# Patient Record
Sex: MALE | Race: WHITE | NOT HISPANIC OR LATINO | URBAN - METROPOLITAN AREA
[De-identification: names, ages, dates, MRNs, and addresses within clinical notes are randomized per-mention and may not be internally consistent; named-entity substitution may affect disease eponyms.]

---

## 2018-01-01 ENCOUNTER — INPATIENT (INPATIENT)
Facility: HOSPITAL | Age: 48
LOS: 2 days | End: 2018-12-21
Attending: INTERNAL MEDICINE | Admitting: INTERNAL MEDICINE
Payer: COMMERCIAL

## 2018-01-01 VITALS
RESPIRATION RATE: 18 BRPM | OXYGEN SATURATION: 100 % | HEART RATE: 112 BPM | SYSTOLIC BLOOD PRESSURE: 84 MMHG | DIASTOLIC BLOOD PRESSURE: 49 MMHG

## 2018-01-01 VITALS
SYSTOLIC BLOOD PRESSURE: 70 MMHG | RESPIRATION RATE: 16 BRPM | DIASTOLIC BLOOD PRESSURE: 34 MMHG | HEART RATE: 102 BPM | TEMPERATURE: 94 F

## 2018-01-01 LAB
ALBUMIN SERPL ELPH-MCNC: 1.9 G/DL — LOW (ref 3.5–5.2)
ALBUMIN SERPL ELPH-MCNC: 1.9 G/DL — LOW (ref 3.5–5.2)
ALBUMIN SERPL ELPH-MCNC: 2.3 G/DL — LOW (ref 3.5–5.2)
ALP SERPL-CCNC: 787 U/L — HIGH (ref 30–115)
ALP SERPL-CCNC: 842 U/L — HIGH (ref 30–115)
ALP SERPL-CCNC: 881 U/L — HIGH (ref 30–115)
ALT FLD-CCNC: 530 U/L — HIGH (ref 0–41)
ALT FLD-CCNC: 611 U/L — HIGH (ref 0–41)
ALT FLD-CCNC: 844 U/L — HIGH (ref 0–41)
AMMONIA BLD-MCNC: 97 UMOL/L — HIGH (ref 11–55)
ANION GAP SERPL CALC-SCNC: 25 MMOL/L — HIGH (ref 7–14)
ANION GAP SERPL CALC-SCNC: 28 MMOL/L — HIGH (ref 7–14)
ANION GAP SERPL CALC-SCNC: 28 MMOL/L — HIGH (ref 7–14)
ANION GAP SERPL CALC-SCNC: 29 MMOL/L — HIGH (ref 7–14)
ANION GAP SERPL CALC-SCNC: 29 MMOL/L — HIGH (ref 7–14)
ANION GAP SERPL CALC-SCNC: 30 MMOL/L — HIGH (ref 7–14)
ANION GAP SERPL CALC-SCNC: 30 MMOL/L — HIGH (ref 7–14)
APTT BLD: 37.7 SEC — SIGNIFICANT CHANGE UP (ref 27–39.2)
AST SERPL-CCNC: 2229 U/L — HIGH (ref 0–41)
AST SERPL-CCNC: 2617 U/L — HIGH (ref 0–41)
AST SERPL-CCNC: 3118 U/L — HIGH (ref 0–41)
BASE EXCESS BLDV CALC-SCNC: -10.3 MMOL/L — LOW (ref -2–2)
BASOPHILS # BLD AUTO: 0.01 K/UL — SIGNIFICANT CHANGE UP (ref 0–0.2)
BASOPHILS # BLD AUTO: 0.02 K/UL — SIGNIFICANT CHANGE UP (ref 0–0.2)
BASOPHILS NFR BLD AUTO: 0.1 % — SIGNIFICANT CHANGE UP (ref 0–1)
BASOPHILS NFR BLD AUTO: 0.2 % — SIGNIFICANT CHANGE UP (ref 0–1)
BILIRUB DIRECT SERPL-MCNC: 5.2 MG/DL — HIGH (ref 0–0.2)
BILIRUB INDIRECT FLD-MCNC: 1.5 MG/DL — HIGH (ref 0.2–1.2)
BILIRUB SERPL-MCNC: 6.6 MG/DL — HIGH (ref 0.2–1.2)
BILIRUB SERPL-MCNC: 6.7 MG/DL — HIGH (ref 0.2–1.2)
BILIRUB SERPL-MCNC: 7.4 MG/DL — HIGH (ref 0.2–1.2)
BUN SERPL-MCNC: 37 MG/DL — HIGH (ref 10–20)
BUN SERPL-MCNC: 37 MG/DL — HIGH (ref 10–20)
BUN SERPL-MCNC: 38 MG/DL — HIGH (ref 10–20)
BUN SERPL-MCNC: 38 MG/DL — HIGH (ref 10–20)
BUN SERPL-MCNC: 40 MG/DL — HIGH (ref 10–20)
BUN SERPL-MCNC: 41 MG/DL — HIGH (ref 10–20)
BUN SERPL-MCNC: 41 MG/DL — HIGH (ref 10–20)
CA-I SERPL-SCNC: 1.11 MMOL/L — LOW (ref 1.12–1.3)
CALCIUM SERPL-MCNC: 7.1 MG/DL — LOW (ref 8.5–10.1)
CALCIUM SERPL-MCNC: 7.4 MG/DL — LOW (ref 8.5–10.1)
CALCIUM SERPL-MCNC: 7.6 MG/DL — LOW (ref 8.5–10.1)
CALCIUM SERPL-MCNC: 7.8 MG/DL — LOW (ref 8.5–10.1)
CALCIUM SERPL-MCNC: 8 MG/DL — LOW (ref 8.5–10.1)
CALCIUM SERPL-MCNC: 8.1 MG/DL — LOW (ref 8.5–10.1)
CALCIUM SERPL-MCNC: 8.5 MG/DL — SIGNIFICANT CHANGE UP (ref 8.5–10.1)
CEA SERPL-MCNC: HIGH NG/ML (ref 0–3.8)
CHLORIDE SERPL-SCNC: 89 MMOL/L — LOW (ref 98–110)
CHLORIDE SERPL-SCNC: 90 MMOL/L — LOW (ref 98–110)
CHLORIDE SERPL-SCNC: 91 MMOL/L — LOW (ref 98–110)
CHLORIDE SERPL-SCNC: 92 MMOL/L — LOW (ref 98–110)
CHLORIDE SERPL-SCNC: 93 MMOL/L — LOW (ref 98–110)
CK SERPL-CCNC: 580 U/L — HIGH (ref 0–225)
CO2 SERPL-SCNC: 10 MMOL/L — LOW (ref 17–32)
CO2 SERPL-SCNC: 11 MMOL/L — LOW (ref 17–32)
CO2 SERPL-SCNC: 12 MMOL/L — LOW (ref 17–32)
CO2 SERPL-SCNC: 13 MMOL/L — LOW (ref 17–32)
CO2 SERPL-SCNC: 14 MMOL/L — LOW (ref 17–32)
CREAT SERPL-MCNC: 2.4 MG/DL — HIGH (ref 0.7–1.5)
CREAT SERPL-MCNC: 2.7 MG/DL — HIGH (ref 0.7–1.5)
CREAT SERPL-MCNC: 2.8 MG/DL — HIGH (ref 0.7–1.5)
CREAT SERPL-MCNC: 3.1 MG/DL — HIGH (ref 0.7–1.5)
CREAT SERPL-MCNC: 3.4 MG/DL — HIGH (ref 0.7–1.5)
EOSINOPHIL # BLD AUTO: 0 K/UL — SIGNIFICANT CHANGE UP (ref 0–0.7)
EOSINOPHIL # BLD AUTO: 0.01 K/UL — SIGNIFICANT CHANGE UP (ref 0–0.7)
EOSINOPHIL # BLD AUTO: 0.01 K/UL — SIGNIFICANT CHANGE UP (ref 0–0.7)
EOSINOPHIL # BLD AUTO: 0.03 K/UL — SIGNIFICANT CHANGE UP (ref 0–0.7)
EOSINOPHIL NFR BLD AUTO: 0 % — SIGNIFICANT CHANGE UP (ref 0–8)
EOSINOPHIL NFR BLD AUTO: 0.1 % — SIGNIFICANT CHANGE UP (ref 0–8)
EOSINOPHIL NFR BLD AUTO: 0.1 % — SIGNIFICANT CHANGE UP (ref 0–8)
EOSINOPHIL NFR BLD AUTO: 0.3 % — SIGNIFICANT CHANGE UP (ref 0–8)
GAS PNL BLDA: SIGNIFICANT CHANGE UP
GAS PNL BLDV: 130 MMOL/L — LOW (ref 136–145)
GAS PNL BLDV: SIGNIFICANT CHANGE UP
GAS PNL BLDV: SIGNIFICANT CHANGE UP
GLUCOSE BLDC GLUCOMTR-MCNC: 105 MG/DL — HIGH (ref 70–99)
GLUCOSE BLDC GLUCOMTR-MCNC: 121 MG/DL — HIGH (ref 70–99)
GLUCOSE BLDC GLUCOMTR-MCNC: 126 MG/DL — HIGH (ref 70–99)
GLUCOSE BLDC GLUCOMTR-MCNC: 128 MG/DL — HIGH (ref 70–99)
GLUCOSE BLDC GLUCOMTR-MCNC: 132 MG/DL — HIGH (ref 70–99)
GLUCOSE BLDC GLUCOMTR-MCNC: 155 MG/DL — HIGH (ref 70–99)
GLUCOSE BLDC GLUCOMTR-MCNC: 57 MG/DL — LOW (ref 70–99)
GLUCOSE BLDC GLUCOMTR-MCNC: 58 MG/DL — LOW (ref 70–99)
GLUCOSE BLDC GLUCOMTR-MCNC: 61 MG/DL — LOW (ref 70–99)
GLUCOSE BLDC GLUCOMTR-MCNC: 76 MG/DL — SIGNIFICANT CHANGE UP (ref 70–99)
GLUCOSE BLDC GLUCOMTR-MCNC: 85 MG/DL — SIGNIFICANT CHANGE UP (ref 70–99)
GLUCOSE BLDC GLUCOMTR-MCNC: 90 MG/DL — SIGNIFICANT CHANGE UP (ref 70–99)
GLUCOSE BLDC GLUCOMTR-MCNC: 90 MG/DL — SIGNIFICANT CHANGE UP (ref 70–99)
GLUCOSE SERPL-MCNC: 104 MG/DL — HIGH (ref 70–99)
GLUCOSE SERPL-MCNC: 108 MG/DL — HIGH (ref 70–99)
GLUCOSE SERPL-MCNC: 125 MG/DL — HIGH (ref 70–99)
GLUCOSE SERPL-MCNC: 51 MG/DL — LOW (ref 70–99)
GLUCOSE SERPL-MCNC: 56 MG/DL — LOW (ref 70–99)
GLUCOSE SERPL-MCNC: 71 MG/DL — SIGNIFICANT CHANGE UP (ref 70–99)
GLUCOSE SERPL-MCNC: 76 MG/DL — SIGNIFICANT CHANGE UP (ref 70–99)
HCO3 BLDV-SCNC: 16 MMOL/L — LOW (ref 22–29)
HCT VFR BLD CALC: 26.4 % — LOW (ref 42–52)
HCT VFR BLD CALC: 26.8 % — LOW (ref 42–52)
HCT VFR BLD CALC: 26.9 % — LOW (ref 42–52)
HCT VFR BLD CALC: 28.3 % — LOW (ref 42–52)
HCT VFR BLDA CALC: 25.9 % — LOW (ref 34–44)
HGB BLD CALC-MCNC: 8.4 G/DL — LOW (ref 14–18)
HGB BLD-MCNC: 8.5 G/DL — LOW (ref 14–18)
HGB BLD-MCNC: 8.5 G/DL — LOW (ref 14–18)
HGB BLD-MCNC: 8.6 G/DL — LOW (ref 14–18)
HGB BLD-MCNC: 9.1 G/DL — LOW (ref 14–18)
IMM GRANULOCYTES NFR BLD AUTO: 0.3 % — SIGNIFICANT CHANGE UP (ref 0.1–0.3)
IMM GRANULOCYTES NFR BLD AUTO: 0.4 % — HIGH (ref 0.1–0.3)
IMM GRANULOCYTES NFR BLD AUTO: 0.5 % — HIGH (ref 0.1–0.3)
IMM GRANULOCYTES NFR BLD AUTO: 0.6 % — HIGH (ref 0.1–0.3)
INR BLD: 1.96 RATIO — HIGH (ref 0.65–1.3)
LACTATE BLDV-MCNC: 7.7 MMOL/L — HIGH (ref 0.5–1.6)
LACTATE SERPL-SCNC: 11.4 MMOL/L — CRITICAL HIGH (ref 0.5–2.2)
LACTATE SERPL-SCNC: 12.6 MMOL/L — CRITICAL HIGH (ref 0.5–2.2)
LACTATE SERPL-SCNC: 8.9 MMOL/L — CRITICAL HIGH (ref 0.5–2.2)
LACTATE SERPL-SCNC: 9.7 MMOL/L — CRITICAL HIGH (ref 0.5–2.2)
LIDOCAIN IGE QN: 13 U/L — SIGNIFICANT CHANGE UP (ref 7–60)
LYMPHOCYTES # BLD AUTO: 0.75 K/UL — LOW (ref 1.2–3.4)
LYMPHOCYTES # BLD AUTO: 0.77 K/UL — LOW (ref 1.2–3.4)
LYMPHOCYTES # BLD AUTO: 0.91 K/UL — LOW (ref 1.2–3.4)
LYMPHOCYTES # BLD AUTO: 1.04 K/UL — LOW (ref 1.2–3.4)
LYMPHOCYTES # BLD AUTO: 6.4 % — LOW (ref 20.5–51.1)
LYMPHOCYTES # BLD AUTO: 7.6 % — LOW (ref 20.5–51.1)
LYMPHOCYTES # BLD AUTO: 7.8 % — LOW (ref 20.5–51.1)
LYMPHOCYTES # BLD AUTO: 9.5 % — LOW (ref 20.5–51.1)
MAGNESIUM SERPL-MCNC: 2 MG/DL — SIGNIFICANT CHANGE UP (ref 1.8–2.4)
MAGNESIUM SERPL-MCNC: 2.1 MG/DL — SIGNIFICANT CHANGE UP (ref 1.8–2.4)
MAGNESIUM SERPL-MCNC: 2.2 MG/DL — SIGNIFICANT CHANGE UP (ref 1.8–2.4)
MAGNESIUM SERPL-MCNC: 2.2 MG/DL — SIGNIFICANT CHANGE UP (ref 1.8–2.4)
MAGNESIUM SERPL-MCNC: 2.3 MG/DL — SIGNIFICANT CHANGE UP (ref 1.8–2.4)
MCHC RBC-ENTMCNC: 30.7 PG — SIGNIFICANT CHANGE UP (ref 27–31)
MCHC RBC-ENTMCNC: 30.8 PG — SIGNIFICANT CHANGE UP (ref 27–31)
MCHC RBC-ENTMCNC: 30.9 PG — SIGNIFICANT CHANGE UP (ref 27–31)
MCHC RBC-ENTMCNC: 31 PG — SIGNIFICANT CHANGE UP (ref 27–31)
MCHC RBC-ENTMCNC: 31.7 G/DL — LOW (ref 32–37)
MCHC RBC-ENTMCNC: 32 G/DL — SIGNIFICANT CHANGE UP (ref 32–37)
MCHC RBC-ENTMCNC: 32.2 G/DL — SIGNIFICANT CHANGE UP (ref 32–37)
MCHC RBC-ENTMCNC: 32.2 G/DL — SIGNIFICANT CHANGE UP (ref 32–37)
MCV RBC AUTO: 95.9 FL — HIGH (ref 80–94)
MCV RBC AUTO: 96 FL — HIGH (ref 80–94)
MCV RBC AUTO: 96.8 FL — HIGH (ref 80–94)
MCV RBC AUTO: 97.1 FL — HIGH (ref 80–94)
MONOCYTES # BLD AUTO: 1.36 K/UL — HIGH (ref 0.1–0.6)
MONOCYTES # BLD AUTO: 1.68 K/UL — HIGH (ref 0.1–0.6)
MONOCYTES # BLD AUTO: 1.8 K/UL — HIGH (ref 0.1–0.6)
MONOCYTES # BLD AUTO: 2.13 K/UL — HIGH (ref 0.1–0.6)
MONOCYTES NFR BLD AUTO: 11.7 % — HIGH (ref 1.7–9.3)
MONOCYTES NFR BLD AUTO: 13.2 % — HIGH (ref 1.7–9.3)
MONOCYTES NFR BLD AUTO: 17.1 % — HIGH (ref 1.7–9.3)
MONOCYTES NFR BLD AUTO: 22.2 % — HIGH (ref 1.7–9.3)
NEUTROPHILS # BLD AUTO: 10.7 K/UL — HIGH (ref 1.4–6.5)
NEUTROPHILS # BLD AUTO: 6.49 K/UL — SIGNIFICANT CHANGE UP (ref 1.4–6.5)
NEUTROPHILS # BLD AUTO: 7.3 K/UL — HIGH (ref 1.4–6.5)
NEUTROPHILS # BLD AUTO: 9.45 K/UL — HIGH (ref 1.4–6.5)
NEUTROPHILS NFR BLD AUTO: 67.5 % — SIGNIFICANT CHANGE UP (ref 42.2–75.2)
NEUTROPHILS NFR BLD AUTO: 74.5 % — SIGNIFICANT CHANGE UP (ref 42.2–75.2)
NEUTROPHILS NFR BLD AUTO: 78.4 % — HIGH (ref 42.2–75.2)
NEUTROPHILS NFR BLD AUTO: 81.3 % — HIGH (ref 42.2–75.2)
NRBC # BLD: 0 /100 WBCS — SIGNIFICANT CHANGE UP (ref 0–0)
NRBC # BLD: 0 /100 WBCS — SIGNIFICANT CHANGE UP (ref 0–0)
NRBC # BLD: 2 /100 WBCS — HIGH (ref 0–0)
NT-PROBNP SERPL-SCNC: 1556 PG/ML — HIGH (ref 0–300)
NT-PROBNP SERPL-SCNC: 1839 PG/ML — HIGH (ref 0–300)
PCO2 BLDV: 35 MMHG — LOW (ref 41–51)
PH BLDV: 7.26 — SIGNIFICANT CHANGE UP (ref 7.26–7.43)
PLATELET # BLD AUTO: 185 K/UL — SIGNIFICANT CHANGE UP (ref 130–400)
PLATELET # BLD AUTO: 189 K/UL — SIGNIFICANT CHANGE UP (ref 130–400)
PLATELET # BLD AUTO: 235 K/UL — SIGNIFICANT CHANGE UP (ref 130–400)
PLATELET # BLD AUTO: 270 K/UL — SIGNIFICANT CHANGE UP (ref 130–400)
PO2 BLDV: 40 MMHG — SIGNIFICANT CHANGE UP (ref 20–40)
POTASSIUM BLDV-SCNC: 5.8 MMOL/L — HIGH (ref 3.3–5.6)
POTASSIUM SERPL-MCNC: 6.2 MMOL/L — CRITICAL HIGH (ref 3.5–5)
POTASSIUM SERPL-MCNC: 6.2 MMOL/L — CRITICAL HIGH (ref 3.5–5)
POTASSIUM SERPL-MCNC: 6.5 MMOL/L — CRITICAL HIGH (ref 3.5–5)
POTASSIUM SERPL-MCNC: 6.7 MMOL/L — CRITICAL HIGH (ref 3.5–5)
POTASSIUM SERPL-MCNC: 6.9 MMOL/L — CRITICAL HIGH (ref 3.5–5)
POTASSIUM SERPL-SCNC: 6.2 MMOL/L — CRITICAL HIGH (ref 3.5–5)
POTASSIUM SERPL-SCNC: 6.2 MMOL/L — CRITICAL HIGH (ref 3.5–5)
POTASSIUM SERPL-SCNC: 6.5 MMOL/L — CRITICAL HIGH (ref 3.5–5)
POTASSIUM SERPL-SCNC: 6.7 MMOL/L — CRITICAL HIGH (ref 3.5–5)
POTASSIUM SERPL-SCNC: 6.9 MMOL/L — CRITICAL HIGH (ref 3.5–5)
PROT SERPL-MCNC: 5.9 G/DL — LOW (ref 6–8)
PROT SERPL-MCNC: 6.2 G/DL — SIGNIFICANT CHANGE UP (ref 6–8)
PROT SERPL-MCNC: 6.5 G/DL — SIGNIFICANT CHANGE UP (ref 6–8)
PROTHROM AB SERPL-ACNC: 22.4 SEC — HIGH (ref 9.95–12.87)
RBC # BLD: 2.75 M/UL — LOW (ref 4.7–6.1)
RBC # BLD: 2.77 M/UL — LOW (ref 4.7–6.1)
RBC # BLD: 2.77 M/UL — LOW (ref 4.7–6.1)
RBC # BLD: 2.95 M/UL — LOW (ref 4.7–6.1)
RBC # FLD: 18.6 % — HIGH (ref 11.5–14.5)
RBC # FLD: 18.8 % — HIGH (ref 11.5–14.5)
SAO2 % BLDV: 58 % — SIGNIFICANT CHANGE UP
SODIUM SERPL-SCNC: 129 MMOL/L — LOW (ref 135–146)
SODIUM SERPL-SCNC: 130 MMOL/L — LOW (ref 135–146)
SODIUM SERPL-SCNC: 131 MMOL/L — LOW (ref 135–146)
SODIUM SERPL-SCNC: 132 MMOL/L — LOW (ref 135–146)
SODIUM SERPL-SCNC: 132 MMOL/L — LOW (ref 135–146)
TROPONIN T SERPL-MCNC: 0.02 NG/ML — HIGH
TYPE + AB SCN PNL BLD: SIGNIFICANT CHANGE UP
WBC # BLD: 11.63 K/UL — HIGH (ref 4.8–10.8)
WBC # BLD: 13.64 K/UL — HIGH (ref 4.8–10.8)
WBC # BLD: 9.61 K/UL — SIGNIFICANT CHANGE UP (ref 4.8–10.8)
WBC # BLD: 9.81 K/UL — SIGNIFICANT CHANGE UP (ref 4.8–10.8)
WBC # FLD AUTO: 11.63 K/UL — HIGH (ref 4.8–10.8)
WBC # FLD AUTO: 13.64 K/UL — HIGH (ref 4.8–10.8)
WBC # FLD AUTO: 9.61 K/UL — SIGNIFICANT CHANGE UP (ref 4.8–10.8)
WBC # FLD AUTO: 9.81 K/UL — SIGNIFICANT CHANGE UP (ref 4.8–10.8)

## 2018-01-01 PROCEDURE — 93970 EXTREMITY STUDY: CPT | Mod: 26

## 2018-01-01 RX ORDER — INSULIN HUMAN 100 [IU]/ML
10 INJECTION, SOLUTION SUBCUTANEOUS ONCE
Qty: 0 | Refills: 0 | Status: COMPLETED | OUTPATIENT
Start: 2018-01-01 | End: 2018-01-01

## 2018-01-01 RX ORDER — HEPARIN SODIUM 5000 [USP'U]/ML
5000 INJECTION INTRAVENOUS; SUBCUTANEOUS EVERY 8 HOURS
Qty: 0 | Refills: 0 | Status: DISCONTINUED | OUTPATIENT
Start: 2018-01-01 | End: 2018-01-01

## 2018-01-01 RX ORDER — MORPHINE SULFATE 50 MG/1
1 CAPSULE, EXTENDED RELEASE ORAL EVERY 6 HOURS
Qty: 0 | Refills: 0 | Status: DISCONTINUED | OUTPATIENT
Start: 2018-01-01 | End: 2018-01-01

## 2018-01-01 RX ORDER — VANCOMYCIN HCL 1 G
1500 VIAL (EA) INTRAVENOUS EVERY 24 HOURS
Qty: 0 | Refills: 0 | Status: DISCONTINUED | OUTPATIENT
Start: 2018-01-01 | End: 2018-01-01

## 2018-01-01 RX ORDER — CALCIUM GLUCONATE 100 MG/ML
1 VIAL (ML) INTRAVENOUS ONCE
Qty: 0 | Refills: 0 | Status: DISCONTINUED | OUTPATIENT
Start: 2018-01-01 | End: 2018-01-01

## 2018-01-01 RX ORDER — MORPHINE SULFATE 50 MG/1
2 CAPSULE, EXTENDED RELEASE ORAL EVERY 4 HOURS
Qty: 0 | Refills: 0 | Status: DISCONTINUED | OUTPATIENT
Start: 2018-01-01 | End: 2018-01-01

## 2018-01-01 RX ORDER — MORPHINE SULFATE 50 MG/1
1 CAPSULE, EXTENDED RELEASE ORAL ONCE
Qty: 0 | Refills: 0 | Status: DISCONTINUED | OUTPATIENT
Start: 2018-01-01 | End: 2018-01-01

## 2018-01-01 RX ORDER — DEXTROSE 50 % IN WATER 50 %
50 SYRINGE (ML) INTRAVENOUS ONCE
Qty: 0 | Refills: 0 | Status: COMPLETED | OUTPATIENT
Start: 2018-01-01 | End: 2018-01-01

## 2018-01-01 RX ORDER — VANCOMYCIN HCL 1 G
VIAL (EA) INTRAVENOUS
Qty: 0 | Refills: 0 | Status: DISCONTINUED | OUTPATIENT
Start: 2018-01-01 | End: 2018-01-01

## 2018-01-01 RX ORDER — DEXTROSE 50 % IN WATER 50 %
25 SYRINGE (ML) INTRAVENOUS ONCE
Qty: 0 | Refills: 0 | Status: COMPLETED | OUTPATIENT
Start: 2018-01-01 | End: 2018-01-01

## 2018-01-01 RX ORDER — PANTOPRAZOLE SODIUM 20 MG/1
40 TABLET, DELAYED RELEASE ORAL DAILY
Qty: 0 | Refills: 0 | Status: DISCONTINUED | OUTPATIENT
Start: 2018-01-01 | End: 2018-01-01

## 2018-01-01 RX ORDER — SODIUM CHLORIDE 9 MG/ML
1000 INJECTION, SOLUTION INTRAVENOUS
Qty: 0 | Refills: 0 | Status: DISCONTINUED | OUTPATIENT
Start: 2018-01-01 | End: 2018-01-01

## 2018-01-01 RX ORDER — MIDODRINE HYDROCHLORIDE 2.5 MG/1
5 TABLET ORAL THREE TIMES A DAY
Qty: 0 | Refills: 0 | Status: DISCONTINUED | OUTPATIENT
Start: 2018-01-01 | End: 2018-01-01

## 2018-01-01 RX ORDER — CALCIUM GLUCONATE 100 MG/ML
2 VIAL (ML) INTRAVENOUS ONCE
Qty: 0 | Refills: 0 | Status: COMPLETED | OUTPATIENT
Start: 2018-01-01 | End: 2018-01-01

## 2018-01-01 RX ORDER — HYDROCORTISONE 20 MG
100 TABLET ORAL EVERY 8 HOURS
Qty: 0 | Refills: 0 | Status: DISCONTINUED | OUTPATIENT
Start: 2018-01-01 | End: 2018-01-01

## 2018-01-01 RX ORDER — CEFTRIAXONE 500 MG/1
1 INJECTION, POWDER, FOR SOLUTION INTRAMUSCULAR; INTRAVENOUS EVERY 24 HOURS
Qty: 0 | Refills: 0 | Status: DISCONTINUED | OUTPATIENT
Start: 2018-01-01 | End: 2018-01-01

## 2018-01-01 RX ORDER — MIDODRINE HYDROCHLORIDE 2.5 MG/1
10 TABLET ORAL ONCE
Qty: 0 | Refills: 0 | Status: COMPLETED | OUTPATIENT
Start: 2018-01-01 | End: 2018-01-01

## 2018-01-01 RX ORDER — ALBUTEROL 90 UG/1
2.5 AEROSOL, METERED ORAL ONCE
Qty: 0 | Refills: 0 | Status: COMPLETED | OUTPATIENT
Start: 2018-01-01 | End: 2018-01-01

## 2018-01-01 RX ORDER — URSODIOL 250 MG/1
1 TABLET, FILM COATED ORAL
Qty: 0 | Refills: 0 | COMMUNITY

## 2018-01-01 RX ORDER — SODIUM CHLORIDE 9 MG/ML
2000 INJECTION, SOLUTION INTRAVENOUS ONCE
Qty: 0 | Refills: 0 | Status: COMPLETED | OUTPATIENT
Start: 2018-01-01 | End: 2018-01-01

## 2018-01-01 RX ORDER — ALBUTEROL 90 UG/1
12 AEROSOL, METERED ORAL ONCE
Qty: 0 | Refills: 0 | Status: DISCONTINUED | OUTPATIENT
Start: 2018-01-01 | End: 2018-01-01

## 2018-01-01 RX ORDER — OXYCODONE HYDROCHLORIDE 5 MG/1
1 TABLET ORAL
Qty: 0 | Refills: 0 | COMMUNITY

## 2018-01-01 RX ORDER — FUROSEMIDE 40 MG
1 TABLET ORAL
Qty: 0 | Refills: 0 | COMMUNITY

## 2018-01-01 RX ORDER — CALCIUM GLUCONATE 100 MG/ML
1 VIAL (ML) INTRAVENOUS ONCE
Qty: 0 | Refills: 0 | Status: COMPLETED | OUTPATIENT
Start: 2018-01-01 | End: 2018-01-01

## 2018-01-01 RX ORDER — VANCOMYCIN HCL 1 G
1500 VIAL (EA) INTRAVENOUS ONCE
Qty: 0 | Refills: 0 | Status: COMPLETED | OUTPATIENT
Start: 2018-01-01 | End: 2018-01-01

## 2018-01-01 RX ORDER — INSULIN LISPRO 100/ML
10 VIAL (ML) SUBCUTANEOUS ONCE
Qty: 0 | Refills: 0 | Status: DISCONTINUED | OUTPATIENT
Start: 2018-01-01 | End: 2018-01-01

## 2018-01-01 RX ORDER — SODIUM POLYSTYRENE SULFONATE 4.1 MEQ/G
15 POWDER, FOR SUSPENSION ORAL ONCE
Qty: 0 | Refills: 0 | Status: COMPLETED | OUTPATIENT
Start: 2018-01-01 | End: 2018-01-01

## 2018-01-01 RX ORDER — MIDODRINE HYDROCHLORIDE 2.5 MG/1
10 TABLET ORAL EVERY 8 HOURS
Qty: 0 | Refills: 0 | Status: DISCONTINUED | OUTPATIENT
Start: 2018-01-01 | End: 2018-01-01

## 2018-01-01 RX ORDER — SODIUM POLYSTYRENE SULFONATE 4.1 MEQ/G
15 POWDER, FOR SUSPENSION ORAL ONCE
Qty: 0 | Refills: 0 | Status: DISCONTINUED | OUTPATIENT
Start: 2018-01-01 | End: 2018-01-01

## 2018-01-01 RX ORDER — SPIRONOLACTONE 25 MG/1
1 TABLET, FILM COATED ORAL
Qty: 0 | Refills: 0 | COMMUNITY

## 2018-01-01 RX ORDER — CEFTRIAXONE 500 MG/1
INJECTION, POWDER, FOR SOLUTION INTRAMUSCULAR; INTRAVENOUS
Qty: 0 | Refills: 0 | Status: DISCONTINUED | OUTPATIENT
Start: 2018-01-01 | End: 2018-01-01

## 2018-01-01 RX ORDER — CHLORHEXIDINE GLUCONATE 213 G/1000ML
1 SOLUTION TOPICAL
Qty: 0 | Refills: 0 | Status: DISCONTINUED | OUTPATIENT
Start: 2018-01-01 | End: 2018-01-01

## 2018-01-01 RX ORDER — NOREPINEPHRINE BITARTRATE/D5W 8 MG/250ML
0.05 PLASTIC BAG, INJECTION (ML) INTRAVENOUS
Qty: 8 | Refills: 0 | Status: DISCONTINUED | OUTPATIENT
Start: 2018-01-01 | End: 2018-01-01

## 2018-01-01 RX ORDER — DEXTROSE 50 % IN WATER 50 %
100 SYRINGE (ML) INTRAVENOUS ONCE
Qty: 0 | Refills: 0 | Status: COMPLETED | OUTPATIENT
Start: 2018-01-01 | End: 2018-01-01

## 2018-01-01 RX ORDER — INSULIN LISPRO 100/ML
10 VIAL (ML) SUBCUTANEOUS ONCE
Qty: 0 | Refills: 0 | Status: COMPLETED | OUTPATIENT
Start: 2018-01-01 | End: 2018-01-01

## 2018-01-01 RX ORDER — NOREPINEPHRINE BITARTRATE/D5W 8 MG/250ML
0.05 PLASTIC BAG, INJECTION (ML) INTRAVENOUS
Qty: 16 | Refills: 0 | Status: DISCONTINUED | OUTPATIENT
Start: 2018-01-01 | End: 2018-01-01

## 2018-01-01 RX ORDER — PANTOPRAZOLE SODIUM 20 MG/1
40 TABLET, DELAYED RELEASE ORAL
Qty: 0 | Refills: 0 | Status: DISCONTINUED | OUTPATIENT
Start: 2018-01-01 | End: 2018-01-01

## 2018-01-01 RX ORDER — CEFTRIAXONE 500 MG/1
1 INJECTION, POWDER, FOR SOLUTION INTRAMUSCULAR; INTRAVENOUS ONCE
Qty: 0 | Refills: 0 | Status: COMPLETED | OUTPATIENT
Start: 2018-01-01 | End: 2018-01-01

## 2018-01-01 RX ORDER — ONDANSETRON 8 MG/1
4 TABLET, FILM COATED ORAL EVERY 8 HOURS
Qty: 0 | Refills: 0 | Status: DISCONTINUED | OUTPATIENT
Start: 2018-01-01 | End: 2018-01-01

## 2018-01-01 RX ADMIN — MORPHINE SULFATE 2 MILLIGRAM(S): 50 CAPSULE, EXTENDED RELEASE ORAL at 11:10

## 2018-01-01 RX ADMIN — Medication 50 MILLILITER(S): at 10:31

## 2018-01-01 RX ADMIN — MORPHINE SULFATE 2 MILLIGRAM(S): 50 CAPSULE, EXTENDED RELEASE ORAL at 10:54

## 2018-01-01 RX ADMIN — Medication 50 MILLILITER(S): at 15:00

## 2018-01-01 RX ADMIN — INSULIN HUMAN 10 UNIT(S): 100 INJECTION, SOLUTION SUBCUTANEOUS at 07:24

## 2018-01-01 RX ADMIN — Medication 100 MILLILITER(S): at 20:19

## 2018-01-01 RX ADMIN — HEPARIN SODIUM 5000 UNIT(S): 5000 INJECTION INTRAVENOUS; SUBCUTANEOUS at 05:10

## 2018-01-01 RX ADMIN — MORPHINE SULFATE 2 MILLIGRAM(S): 50 CAPSULE, EXTENDED RELEASE ORAL at 06:00

## 2018-01-01 RX ADMIN — ALBUTEROL 2.5 MILLIGRAM(S): 90 AEROSOL, METERED ORAL at 05:20

## 2018-01-01 RX ADMIN — MORPHINE SULFATE 2 MILLIGRAM(S): 50 CAPSULE, EXTENDED RELEASE ORAL at 02:25

## 2018-01-01 RX ADMIN — SODIUM CHLORIDE 100 MILLILITER(S): 9 INJECTION, SOLUTION INTRAVENOUS at 20:00

## 2018-01-01 RX ADMIN — HEPARIN SODIUM 5000 UNIT(S): 5000 INJECTION INTRAVENOUS; SUBCUTANEOUS at 05:07

## 2018-01-01 RX ADMIN — CEFTRIAXONE 100 GRAM(S): 500 INJECTION, POWDER, FOR SOLUTION INTRAMUSCULAR; INTRAVENOUS at 18:04

## 2018-01-01 RX ADMIN — CEFTRIAXONE 100 GRAM(S): 500 INJECTION, POWDER, FOR SOLUTION INTRAMUSCULAR; INTRAVENOUS at 18:49

## 2018-01-01 RX ADMIN — Medication 100 MILLIGRAM(S): at 15:05

## 2018-01-01 RX ADMIN — CHLORHEXIDINE GLUCONATE 1 APPLICATION(S): 213 SOLUTION TOPICAL at 05:11

## 2018-01-01 RX ADMIN — Medication 25 GRAM(S): at 05:45

## 2018-01-01 RX ADMIN — Medication 50 MILLILITER(S): at 17:06

## 2018-01-01 RX ADMIN — MORPHINE SULFATE 2 MILLIGRAM(S): 50 CAPSULE, EXTENDED RELEASE ORAL at 01:52

## 2018-01-01 RX ADMIN — INSULIN HUMAN 10 UNIT(S): 100 INJECTION, SOLUTION SUBCUTANEOUS at 21:19

## 2018-01-01 RX ADMIN — SODIUM CHLORIDE 2000 MILLILITER(S): 9 INJECTION, SOLUTION INTRAVENOUS at 20:45

## 2018-01-01 RX ADMIN — Medication 11.81 MICROGRAM(S)/KG/MIN: at 23:05

## 2018-01-01 RX ADMIN — Medication 200 GRAM(S): at 05:10

## 2018-01-01 RX ADMIN — PANTOPRAZOLE SODIUM 40 MILLIGRAM(S): 20 TABLET, DELAYED RELEASE ORAL at 06:10

## 2018-01-01 RX ADMIN — HEPARIN SODIUM 5000 UNIT(S): 5000 INJECTION INTRAVENOUS; SUBCUTANEOUS at 15:05

## 2018-01-01 RX ADMIN — SODIUM POLYSTYRENE SULFONATE 15 GRAM(S): 4.1 POWDER, FOR SUSPENSION ORAL at 21:20

## 2018-01-01 RX ADMIN — Medication 200 GRAM(S): at 22:30

## 2018-01-01 RX ADMIN — HEPARIN SODIUM 5000 UNIT(S): 5000 INJECTION INTRAVENOUS; SUBCUTANEOUS at 21:20

## 2018-01-01 RX ADMIN — HEPARIN SODIUM 5000 UNIT(S): 5000 INJECTION INTRAVENOUS; SUBCUTANEOUS at 21:54

## 2018-01-01 RX ADMIN — Medication 200 GRAM(S): at 21:00

## 2018-01-01 RX ADMIN — INSULIN HUMAN 10 UNIT(S): 100 INJECTION, SOLUTION SUBCUTANEOUS at 07:00

## 2018-01-01 RX ADMIN — CHLORHEXIDINE GLUCONATE 1 APPLICATION(S): 213 SOLUTION TOPICAL at 05:08

## 2018-01-01 RX ADMIN — INSULIN HUMAN 10 UNIT(S): 100 INJECTION, SOLUTION SUBCUTANEOUS at 09:24

## 2018-01-01 RX ADMIN — MORPHINE SULFATE 2 MILLIGRAM(S): 50 CAPSULE, EXTENDED RELEASE ORAL at 21:44

## 2018-01-01 RX ADMIN — Medication 25 GRAM(S): at 03:06

## 2018-01-01 RX ADMIN — HEPARIN SODIUM 5000 UNIT(S): 5000 INJECTION INTRAVENOUS; SUBCUTANEOUS at 13:19

## 2018-01-01 RX ADMIN — Medication 100 MILLIGRAM(S): at 21:20

## 2018-01-01 RX ADMIN — Medication 100 MILLIGRAM(S): at 13:20

## 2018-01-01 RX ADMIN — SODIUM CHLORIDE 1000 MILLILITER(S): 9 INJECTION, SOLUTION INTRAVENOUS at 17:41

## 2018-01-01 RX ADMIN — CEFTRIAXONE 100 GRAM(S): 500 INJECTION, POWDER, FOR SOLUTION INTRAMUSCULAR; INTRAVENOUS at 19:41

## 2018-01-01 RX ADMIN — SODIUM CHLORIDE 100 MILLILITER(S): 9 INJECTION, SOLUTION INTRAVENOUS at 07:00

## 2018-01-01 RX ADMIN — Medication 50 MILLILITER(S): at 19:41

## 2018-01-01 RX ADMIN — SODIUM CHLORIDE 1000 MILLILITER(S): 9 INJECTION, SOLUTION INTRAVENOUS at 17:52

## 2018-01-01 RX ADMIN — Medication 25 GRAM(S): at 07:16

## 2018-01-01 RX ADMIN — Medication 50 MILLILITER(S): at 09:23

## 2018-01-01 RX ADMIN — MORPHINE SULFATE 1 MILLIGRAM(S): 50 CAPSULE, EXTENDED RELEASE ORAL at 23:20

## 2018-01-01 RX ADMIN — Medication 100 MILLIGRAM(S): at 05:11

## 2018-01-01 RX ADMIN — MORPHINE SULFATE 1 MILLIGRAM(S): 50 CAPSULE, EXTENDED RELEASE ORAL at 02:25

## 2018-01-01 RX ADMIN — ALBUTEROL 2.5 MILLIGRAM(S): 90 AEROSOL, METERED ORAL at 21:16

## 2018-01-01 RX ADMIN — INSULIN HUMAN 10 UNIT(S): 100 INJECTION, SOLUTION SUBCUTANEOUS at 19:41

## 2018-01-01 RX ADMIN — Medication 300 MILLIGRAM(S): at 01:55

## 2018-01-01 RX ADMIN — MORPHINE SULFATE 2 MILLIGRAM(S): 50 CAPSULE, EXTENDED RELEASE ORAL at 05:45

## 2018-01-01 RX ADMIN — INSULIN HUMAN 10 UNIT(S): 100 INJECTION, SOLUTION SUBCUTANEOUS at 10:31

## 2018-01-01 RX ADMIN — Medication 5.29 MICROGRAM(S)/KG/MIN: at 20:10

## 2018-01-01 RX ADMIN — ONDANSETRON 4 MILLIGRAM(S): 8 TABLET, FILM COATED ORAL at 02:10

## 2018-01-01 RX ADMIN — PANTOPRAZOLE SODIUM 40 MILLIGRAM(S): 20 TABLET, DELAYED RELEASE ORAL at 12:12

## 2018-01-01 RX ADMIN — HEPARIN SODIUM 5000 UNIT(S): 5000 INJECTION INTRAVENOUS; SUBCUTANEOUS at 00:20

## 2018-01-01 RX ADMIN — Medication 300 MILLIGRAM(S): at 00:20

## 2018-01-01 RX ADMIN — Medication 50 MILLILITER(S): at 18:50

## 2018-01-01 RX ADMIN — Medication 200 GRAM(S): at 17:55

## 2018-01-01 RX ADMIN — Medication 5.29 MICROGRAM(S)/KG/MIN: at 05:07

## 2018-01-01 RX ADMIN — Medication 100 MILLIGRAM(S): at 21:49

## 2018-01-01 RX ADMIN — Medication 25 GRAM(S): at 03:40

## 2018-01-01 RX ADMIN — Medication 300 MILLIGRAM(S): at 00:02

## 2018-01-01 RX ADMIN — Medication 5.29 MICROGRAM(S)/KG/MIN: at 07:00

## 2018-01-01 RX ADMIN — Medication 10 UNIT(S): at 03:04

## 2018-12-18 NOTE — H&P ADULT - NSHPPHYSICALEXAM_GEN_ALL_CORE
PHYSICAL EXAM:  GENERAL: NAD, speaks in full sentences, no signs of respiratory distress  HEAD:  Atraumatic, Normocephalic  EYES: EOMI, PERRLA, conjunctiva and sclera clear  NECK: Supple, No JVD  CHEST/LUNG: Diminished air entry on right side. Left side slight expiratory wheeze nothing significant on auscultation   HEART: Regular rate and rhythm; No murmurs;   ABDOMEN: soft. bowel sounds present Slight tenderness on right upper quadrant.   EXTREMITIES:  Bilateral Lower extremity edema noted.   PSYCH: AAOx3  NEUROLOGY: non-focal  SKIN: No rashes or lesions

## 2018-12-18 NOTE — H&P ADULT - NSHPLABSRESULTS_GEN_ALL_CORE
8.5    9.81  )-----------( 235      ( 18 Dec 2018 17:20 )             26.8     12-18    131<L>  |  90<L>  |  37<H>  ----------------------------<  51<L>  6.7<HH>   |  13<L>  |  2.4<H>    Ca    8.5      18 Dec 2018 17:20  Mg     2.3     12-18    TPro  6.5  /  Alb  2.3<L>  /  TBili  6.7<H>  /  DBili  5.2<H>  /  AST  2229<H>  /  ALT  530<H>  /  AlkPhos  787<H>  12-18    PT/INR - ( 18 Dec 2018 17:20 )   PT: 22.40 sec;   INR: 1.96 ratio         PTT - ( 18 Dec 2018 17:20 )  PTT:37.7 sec

## 2018-12-18 NOTE — ED ADULT NURSE REASSESSMENT NOTE - NS ED NURSE REASSESS COMMENT FT1
MD Mata made aware pt is still hypotensive after LR bolus x2 given  BP 82/50, bedside ultrasound being performed at this time, as per MD will reassess for another fluid bolus will continue to monitor.

## 2018-12-18 NOTE — ED PROVIDER NOTE - PHYSICAL EXAMINATION
CONSTITUTIONAL: Ill appearing male, +anasarca   SKIN: jaundiced skin  HEAD: Normocephalic; atraumatic.  EYES: scleral icterus b/l  ENT: No nasal discharge; airway clear.  NECK: Supple; non tender.  CARD: S1, S2 normal; no murmurs, gallops, or rubs. Regular rate and rhythm.   RESP: No wheezes, rales or rhonchi.  ABD: enlarged, distended, non-tender   EXT: 3+ edema b/l LE  NEURO: Alert, oriented, grossly unremarkable  PSYCH: Cooperative, appropriate.

## 2018-12-18 NOTE — H&P ADULT - HISTORY OF PRESENT ILLNESS
47 y/o male with pmhx of colon CA metastasized to liver and recently lung as per Patients sister. Patient states he has had worsening SOB over the last 3-4 days, not associated with chest pain, coughing. Patient is normally not on home O2. Patient denies ever having paracentesis. Patient was receiving chemotherapy at [facility] but has not since august where [place] gave him a poor prognosis and an estimate of 5-6 months. They recommended hospice at that time and patient decided to transfer his care to Dr. Mary. As per patient and family Dr. Mary stated he is not a candidate for further chemotherapy or any other intervention as he would not be able to tolerate. He currently has an intrahepatic pump in place as per patients family.

## 2018-12-18 NOTE — ED PROVIDER NOTE - OBJECTIVE STATEMENT
47 y/o male with pmhx of colon CA metastasized to liver presents with SOB. Patient states he has had worsening SOB over the last 3-4 days, not associated with chest pain, coughing. Patient is normally not on home O2. Patient denies ever having paracentesis. Patient is currently getting chemotherapy at [facility], has an intrahepatic pump in place. Patient is being seen by Dr. Mary for oncologist.

## 2018-12-18 NOTE — ED PROVIDER NOTE - ATTENDING CONTRIBUTION TO CARE
48 y M with pmhx of colon CA metastasized to liver with liver dysfunction pw SOB x 3-4 days, worsening since being recently started on 40mg Lasix once daily in the morning, and increased from 50mg to 100mg Lasix in the evening. Some cough, nonproductive. No fever, chills, nausea, vomiting, diarrhea, cough, dysuria chest pain, numbness, tingling, weakness.   Exam: NAD, NCAT, HEENT: dry mm, EOMI, PERRLA, Neck: supple, nontender, nl ROM, Heart: tachycardic, RR, no murmur, Lungs: BCTA, appears to be working against the size of his distended abd to take breaths, Abd: NT, + distended, + fluid wave, no guarding or rebound, no CVAT. MSK: chest, back, and ext nontender, nl rom, + bl pitting edema. Neuro: A&Ox3, normal strength, nl sensation throughout, normal speech.   A/P: Concern for volume depletion intravascularly, with possible BELIA. Concern for sepsis - pt hypotensive, tachycardic. Fluids, labs, cx, abx, reassess.

## 2018-12-18 NOTE — ED PROVIDER NOTE - PROGRESS NOTE DETAILS
Per Dr. Johnson, patient is an end stage carcinoma patient with primary colon cancer with mets to the liver; patient is getting intrahepatic chemotherapy at [facility], which implies end stage disease. Advised to obtain hospice consult and admit under Dr. Verdugo's service. Per Dr. Johnson, patient is an end stage carcinoma patient with primary colon cancer with mets to the liver; patient is getting intrahepatic chemotherapy at [facility], which implies end stage disease. Will see patient in hospital. Spoke to Dr. Toribio, aware of patient. Advised to obtain CT chest non-contrast, admit to ICU. Spoke to patient and patient's siblings regarding code status. Patient and family agree to be resuscitated in case of cardiac arrest; however they need more time to think regarding intubation/DNI status. Family was informed that the ICU team will come down to evaluate the patient soon and that they should let the ICU doctors know their decision regarding intubation. ICU resident aware of this conversation Repeated rechecking of pts pressure, and 2 lung USs, first at 1 L, then 3L of LR received, with hyperdynamic cardiac function, collapsing IVC, and lungs showing only A-lines, no B-lines or fluid collection. Continued fluids to 4 L prior to signout to ICU.

## 2018-12-18 NOTE — ED PROVIDER NOTE - NS ED ROS FT
Constitutional: See HPI.  Eyes: No visual changes, eye pain or discharge.  ENMT: No hearing changes, pain, discharge or infections. No neck pain or stiffness.  Cardiac: +SOB, edema. No chest pain   Respiratory: No cough or respiratory distress.   GI: +distended abdomen; No nausea, vomiting, diarrhea or abdominal pain.  : No dysuria, frequency or burning.  MS: No myalgia, muscle weakness, joint pain or back pain.  Neuro: No headache or weakness. No LOC.  Skin: No skin rash.  Endo: No known hx of DM, thyroid disease  Except as documented in HPI, all other review of systems is negative

## 2018-12-18 NOTE — ED ADULT NURSE NOTE - OBJECTIVE STATEMENT
The patient is a 48y Male brought in by ambulance complaining of shortness of breath x3 days. Patient placed on non rebreather mask O2 Sat 100% patients breathing is labored. Patients abdomen appears distended and filled with fluid. Patient has a history of colon cancer and liver cirrhosis. Patient has RCW grace cath. Patient appears jaundiced. Patient denies any fever, chills, chest pain, palpitations, nausea or vomiting.

## 2018-12-18 NOTE — H&P ADULT - ASSESSMENT
47 y/o male with pmhx of colon CA metastasized to liver and recently lung as per Patients sister. Patient states he has had worsening SOB over the last 3-4 days, not associated with chest pain, coughing. Patient Found to have White out of Right Lung.     1) SOB likely secondary to pneumonia vs Lung Metastasis  Continue with Oxygen   Keep Oxygen saturation 90-93%  Follow with Chest CT   Continue with Ceftriaxone and Vancomycin   Follow with Dr. Mary on further recommendations   Case discussed at length with family and will decide this evening on Code status due to patients recent decline and overall poor prognosis.     2) DVT Prophylaxis   Heparin Sub Q     3) GI Prophylaxis   Protonix 40     4) Disposition   Patient admitted to MICU for Monitoring

## 2018-12-18 NOTE — ED ADULT NURSE NOTE - CHPI ED NUR SYMPTOMS NEG
no pain/no tingling/no decreased eating/drinking/no dizziness/no chills/no vomiting/no fever/no nausea/no weakness

## 2018-12-18 NOTE — ED PROVIDER NOTE - CARE PLAN
Principal Discharge DX:	SOB (shortness of breath)  Secondary Diagnosis:	Cirrhosis  Secondary Diagnosis:	Colon cancer  Secondary Diagnosis:	Pneumonia

## 2018-12-18 NOTE — H&P ADULT - ATTENDING COMMENTS
pt seen and examined independently, I have read and agree with above exam and poa    resp distree  pneumonia vs mets  onc eval  Broad spectrum abx  Pulm eval Dr chicas  Id eval  Fu ct chest  supp care

## 2018-12-19 NOTE — CONSULT NOTE ADULT - SUBJECTIVE AND OBJECTIVE BOX
FAUSTINO PIERRE [id number]  48y Male    HPI:  47 y/o male with pmhx of colon CA metastasized to liver and recently lung. Patient states he has had worsening SOB over the last 3-4 days, not associated with chest pain, coughing, not currently on home O2. Patient was receiving chemotherapy at [facility] but has not since august where [place] gave him a poor prognosis and an estimate of 5-6 months. They recommended hospice at that time and patient decided to transfer his care to Dr. Mary. He currently has an intrahepatic pump in place as per patients family.    PAST MEDICAL & SURGICAL HISTORY:  Cirrhosis  Colon cancer  No significant past surgical history      MEDICATIONS  (STANDING):  ALBUTerol    0.083%. 2.5 milliGRAM(s) Nebulizer once  calcium gluconate IVPB 2 Gram(s) IV Intermittent once  cefTRIAXone   IVPB      cefTRIAXone   IVPB 1 Gram(s) IV Intermittent every 24 hours  chlorhexidine 4% Liquid 1 Application(s) Topical <User Schedule>  CRRT Treatment    <Continuous>  dextrose 5% 1000 milliLiter(s) (1000 mL/Hr) IV Continuous <Continuous>  dextrose 5% 1000 milliLiter(s) (100 mL/Hr) IV Continuous <Continuous>  heparin  Injectable 5000 Unit(s) SubCutaneous every 8 hours  hydrocortisone sodium succinate Injectable 100 milliGRAM(s) IV Push every 8 hours  norepinephrine Infusion 0.05 MICROgram(s)/kG/Min (5.292 mL/Hr) IV Continuous <Continuous>  pantoprazole    Tablet 40 milliGRAM(s) Oral before breakfast  PureFlow Dialysate RFP-400 (K 2 / Ca 3) 5000 milliLiter(s) (2000 mL/Hr) CRRT <Continuous>  vancomycin  IVPB        MEDICATIONS  (PRN):  ondansetron Injectable 4 milliGRAM(s) IV Push every 8 hours PRN Nausea and/or Vomiting    REVIEW OF SYSTEMS    [x] A ten-point review of systems was otherwise negative except as noted.    Vital Signs Last 24 Hrs  T(C): 36.9 (19 Dec 2018 15:45), Max: 37.3 (19 Dec 2018 12:00)  T(F): 98.4 (19 Dec 2018 15:45), Max: 99.1 (19 Dec 2018 12:00)  HR: 100 (19 Dec 2018 19:00) (96 - 122)  BP: 93/60 (19 Dec 2018 19:00) (72/48 - 116/61)  BP(mean): 82 (19 Dec 2018 19:00) (56 - 84)  RR: 15 (19 Dec 2018 19:00) (15 - 29)  SpO2: 97% (19 Dec 2018 19:00) (95% - 100%)    PHYSICAL EXAM:  GENERAL: on bipap, agitated, A&Ox1  CHEST/LUNG: Clear to auscultation bilaterally  HEART: tachycardic  ABDOMEN: Soft, Nontender, distended;   EXTREMITIES:  diffuse anasarca     LABS:  POCT Blood Glucose.: 126 mg/dL (19 Dec 2018 21:52)  POCT Blood Glucose.: 105 mg/dL (19 Dec 2018 19:48)  POCT Blood Glucose.: 76 mg/dL (19 Dec 2018 18:22)  POCT Blood Glucose.: 58 mg/dL (19 Dec 2018 15:56)  POCT Blood Glucose.: 90 mg/dL (19 Dec 2018 11:11)  POCT Blood Glucose.: 90 mg/dL (19 Dec 2018 07:30)  POCT Blood Glucose.: 85 mg/dL (19 Dec 2018 05:02)  POCT Blood Glucose.: 61 mg/dL (19 Dec 2018 03:37)                          8.6    9.61  )-----------( 270      ( 19 Dec 2018 04:08 )             26.9       Auto Neutrophil %: 67.5 % (12-19-18 @ 04:08)  Auto Immature Granulocyte %: 0.3 % (12-19-18 @ 04:08)    12-19    131<L>  |  89<L>  |  41<H>  ----------------------------<  71  6.9<HH>   |  12<L>  |  2.8<H>      Calcium, Total Serum: 8.1 mg/dL (12-19-18 @ 17:11)      LFTs:             6.2  | 6.6  | 2617     ------------------[881     ( 19 Dec 2018 04:08 )  1.9  | x    | 611         Lipase:x      Amylase:x         Lactate, Blood: 11.4 mmol/L (12-19-18 @ 17:11)  Blood Gas Arterial, Lactate: 10.1 mmoL/L (12-19-18 @ 08:06)  Lactate, Blood: 9.7 mmol/L (12-19-18 @ 04:08)  Blood Gas Arterial, Lactate: 8.6 mmoL/L (12-19-18 @ 02:56)  Lactate, Blood: 8.9 mmol/L (12-19-18 @ 00:20)  Blood Gas Venous - Lactate: 7.7 mmoL/L (12-18-18 @ 19:25)  Blood Gas Venous - Lactate: 10.2 mmoL/L (12-18-18 @ 17:32)    ABG - ( 19 Dec 2018 08:06 )  pH: 7.31  /  pCO2: 24    /  pO2: 85    / HCO3: 12    / Base Excess: -12.3 /  SaO2: 96        ABG - ( 19 Dec 2018 02:56 )  pH: 7.30  /  pCO2: 27    /  pO2: 51    / HCO3: 13    / Base Excess: -11.8 /  SaO2: 78        Coags:     22.40  ----< 1.96    ( 18 Dec 2018 17:20 )     37.7        CARDIAC MARKERS ( 18 Dec 2018 17:20 )  x     / 0.02 ng/mL / 580 U/L / x     / x

## 2018-12-19 NOTE — PROGRESS NOTE ADULT - ASSESSMENT
Patient is a 48y old Male with PMH of colon cancer with metastasis to liver and lungs admitted to the ICU with a chief complaint of SOB, coughing and chest pain.     1- SOB likely secondary to pneumonia vs lung metastasis.   - c/w oxygen high flow  - f/u non-con CT chest  - Trial of BiPAP  - f/u LE duplex  - c/w abx rec as per ID    2- BELIA with hyperkalemia  - Morris in today (12/19)  - start D5W with NaHCO3  - f/u potassium levels,  - Renal US  - Star5t hydrocortisone 100mg q8.     DVT PPX: Heparin SubQ  GI PPX: Protonix 40  Diet: NPO  Disposition: MICU monitoring  Code: Full Code Patient is a 48y old Male with PMH of colon cancer with metastasis to liver and lungs admitted to the ICU with a chief complaint of SOB, coughing and chest pain.     1- SOB likely secondary to pneumonia vs lung metastases:   - c/w oxygen high flow  - f/u non-con CT chest, check for need to tap.  - BIPAP ON AND OFF  - LE duplex: no evidence of DVT  - c/w abx rec as per ID ( ceftriaxone and vancomycin)  - Advance directives to be discussed    2- BELIA with hyperkalemia  - Jay in today (12/19)  - start Bicarb drip  - f/u potassium levels,  - F/up Renal US  - Start hydrocortisone 100mg q8.     3- Penile pain:  - Swelling of shaft of penis, glans of penis invaginated in the skin with jay in.  - Call urology for f/up and recommendations    DVT PPX: Heparin SubQ  GI PPX: Protonix 40  mg qd  Activity: Bedrest  Diet: NPO  Disposition: MICU monitoring  Code: Full Code for now, discuss advance directives Patient is a 48y old Male with PMH of colon cancer with metastasis to liver and lungs admitted to the ICU with a chief complaint of SOB, coughing and chest pain.     1- SOB likely secondary to suspected GN pneumonia vs lung metastases:   - c/w oxygen high flow  - f/u non-con CT chest, check for need to tap.  - BIPAP ON AND OFF  - LE duplex: no evidence of DVT  - c/w abx rec as per ID ( ceftriaxone and vancomycin)  - Advance directives to be discussed    2- BELIA with hyperkalemia  - Jay in today (12/19)  - start Bicarb drip  - f/u potassium levels,  - F/up Renal US  - Start hydrocortisone 100mg q8.     3- Penile pain:  - Swelling of shaft of penis, glans of penis invaginated in the skin with jay in.  - Call urology for f/up and recommendations    DVT PPX: Heparin SubQ  GI PPX: Protonix 40  mg qd  Activity: Bedrest  Diet: NPO  Disposition: MICU monitoring  Code: Full Code for now, discuss advance directives

## 2018-12-19 NOTE — CONSULT NOTE ADULT - SUBJECTIVE AND OBJECTIVE BOX
NEPHROLOGY CONSULTATION NOTE    49 y/o male with pmhx of colon CA metastasized to liver and recently lung as per Patients sister and record  PT is too lethargic to participate in hx. He is barely arousable and appears terminal   Per notes, Patient states he has had worsening SOB over the last 3-4 days, not associated with chest pain, coughing. Patient is normally not on home O2. Patient denies ever having paracentesis. Patient was receiving chemotherapy at [facility] but has not since august where [place] gave him a poor prognosis and an estimate of 5-6 months. They recommended hospice at that time and patient decided to transfer his care to Dr. Mary. As per patient and family Dr. Mary stated he is not a candidate for further chemotherapy or any other intervention as he would not be able to tolerate. He currently has an intrahepatic pump in place as per patients family.       PAST MEDICAL & SURGICAL HISTORY:  Cirrhosis  Colon cancer  No significant past surgical history    Allergies:  Allergy Status Unknown    Home Medications Reviewed  Hospital Medications:   MEDICATIONS  (STANDING):  cefTRIAXone   IVPB      cefTRIAXone   IVPB 1 Gram(s) IV Intermittent every 24 hours  chlorhexidine 4% Liquid 1 Application(s) Topical <User Schedule>  dextrose 5% 1000 milliLiter(s) (1000 mL/Hr) IV Continuous <Continuous>  dextrose 50% Injectable 50 milliLiter(s) IV Push once  heparin  Injectable 5000 Unit(s) SubCutaneous every 8 hours  hydrocortisone sodium succinate Injectable 100 milliGRAM(s) IV Push every 8 hours  norepinephrine Infusion 0.05 MICROgram(s)/kG/Min (5.292 mL/Hr) IV Continuous <Continuous>  pantoprazole    Tablet 40 milliGRAM(s) Oral before breakfast  vancomycin  IVPB          SOCIAL HISTORY:  Denies ETOH,Smoking,   FAMILY HISTORY:  No pertinent family history in first degree relatives        REVIEW OF SYSTEMS:  unable to obtain.    VITALS:  T(F): 98.2 (12-19-18 @ 08:00), Max: 98.2 (12-19-18 @ 08:00)  HR: 106 (12-19-18 @ 15:45)  BP: 90/54 (12-19-18 @ 15:45)  RR: 19 (12-19-18 @ 15:45)  SpO2: 98% (12-19-18 @ 15:45)    12-18 @ 07:01  -  12-19 @ 07:00  --------------------------------------------------------  IN: 497.5 mL / OUT: 0 mL / NET: 497.5 mL    12-19 @ 07:01  -  12-19 @ 16:21  --------------------------------------------------------  IN: 127 mL / OUT: 30 mL / NET: 97 mL      Height (cm): 177.8 (12-18 @ 23:00)  Weight (kg): 112.9 (12-18 @ 17:41)  BMI (kg/m2): 35.7 (12-18 @ 23:00)  BSA (m2): 2.29 (12-18 @ 23:00)    12-19-18 @ 07:01  -  12-19-18 @ 16:21  --------------------------------------------------------  IN: 0 mL / OUT: 30 mL / NET: -30 mL      I&O's Detail    18 Dec 2018 07:01  -  19 Dec 2018 07:00  --------------------------------------------------------  IN:    norepinephrine Infusion: 497.5 mL  Total IN: 497.5 mL    OUT:  Total OUT: 0 mL    Total NET: 497.5 mL      19 Dec 2018 07:01  -  19 Dec 2018 16:21  --------------------------------------------------------  IN:    norepinephrine Infusion: 127 mL  Total IN: 127 mL    OUT:    Indwelling Catheter - Urethral: 30 mL  Total OUT: 30 mL    Total NET: 97 mL        Creatine Kinase, Serum: 580 U/L (12-18-18 @ 17:20)      PHYSICAL EXAM:  Constitutional: Leathargic on BIPAP  HEENT: anicteric sclera, oropharynx clear, MMM  Neck: No JVD  Respiratory: dec BS on Rt  Cardiovascular: S1, S2, RRR  Gastrointestinal: BS+, soft, NT/ND  Extremities: No cyanosis or clubbing. No peripheral edema  Neurological: Lethargic unarousable   Psychiatric: Normal mood, normal affect  : No CVA tenderness. + jay. minimal UOP   Skin: No rashes  Vascular Access:    LABS:  12-19    132<L>  |  93<L>  |  38<H>  ----------------------------<  104<H>  6.5<HH>   |  11<L>  |  2.4<H>    Ca    7.8<L>      19 Dec 2018 04:08  Mg     2.2     12-19    TPro  6.2  /  Alb  1.9<L>  /  TBili  6.6<H>  /  DBili      /  AST  2617<H>  /  ALT  611<H>  /  AlkPhos  881<H>  12-19    Creatinine Trend: 2.4 <--, 2.4 <--, 2.4 <--                        8.6    9.61  )-----------( 270      ( 19 Dec 2018 04:08 )             26.9     Urine Studies:              RADIOLOGY & ADDITIONAL STUDIES:

## 2018-12-19 NOTE — CONSULT NOTE ADULT - ASSESSMENT
Patient is a 48 year old male with metastatic colon cancer who is no longer candidate for chemotherapy consulted for udall placement prior to [facility]H    Plan:  - [place] placed in left groin under ultrasound guidance Patient is a 48 year old male with metastatic colon cancer who is no longer candidate for chemotherapy consulted for udall placement for [facility]VH    Plan:  - [place] placed in left groin under ultrasound guidance

## 2018-12-19 NOTE — PROGRESS NOTE ADULT - SUBJECTIVE AND OBJECTIVE BOX
SUBJECTIVE:    Patient is a 48y old Male with PMH of colon cancer with metastasis to liver and lungs presenting with a chief complaint of SOB (19 Dec 2018 08:27)    Currently admitted to medicine with the primary diagnosis of SOB (shortness of breath)      Today is hospital day 2d.   Overnight, there were no acute event. This morning, patient is on high flow oxygen, resting comfortably in bed and reports no pain or any signs of distress.     PAST MEDICAL & SURGICAL HISTORY  Cirrhosis  Colon cancer  No significant past surgical history    SOCIAL HISTORY:  1 PPD smoker for 10-15 yrs, stopped in 2015 as per pt.   denies alcohol consumption and illicit drug use.     ALLERGIES:  Allergy Status Unknown    MEDICATIONS:  STANDING MEDICATIONS  cefTRIAXone   IVPB      cefTRIAXone   IVPB 1 Gram(s) IV Intermittent every 24 hours  chlorhexidine 4% Liquid 1 Application(s) Topical <User Schedule>  dextrose 5% 1000 milliLiter(s) IV Continuous <Continuous>  heparin  Injectable 5000 Unit(s) SubCutaneous every 8 hours  hydrocortisone sodium succinate Injectable 100 milliGRAM(s) IV Push every 8 hours  norepinephrine Infusion 0.05 MICROgram(s)/kG/Min IV Continuous <Continuous>  pantoprazole    Tablet 40 milliGRAM(s) Oral before breakfast  vancomycin  IVPB        PRN MEDICATIONS  ondansetron Injectable 4 milliGRAM(s) IV Push every 8 hours PRN    VITALS:   T(F): 98.2  HR: 110  BP: 92/55  RR: 20  SpO2: 99%    LABS:                        8.6    9.61  )-----------( 270      ( 19 Dec 2018 04:08 )             26.9     12-19    132<L>  |  93<L>  |  38<H>  ----------------------------<  104<H>  6.5<HH>   |  11<L>  |  2.4<H>    Ca    7.8<L>      19 Dec 2018 04:08  Mg     2.2     12-19    TPro  6.2  /  Alb  1.9<L>  /  TBili  6.6<H>  /  DBili  x   /  AST  2617<H>  /  ALT  611<H>  /  AlkPhos  881<H>  12-19    PT/INR - ( 18 Dec 2018 17:20 )   PT: 22.40 sec;   INR: 1.96 ratio         PTT - ( 18 Dec 2018 17:20 )  PTT:37.7 sec    ABG - ( 19 Dec 2018 08:06 )  pH, Arterial: 7.31  pH, Blood: x     /  pCO2: 24    /  pO2: 85    / HCO3: 12    / Base Excess: -12.3 /  SaO2: 96        Lactate, Blood: 9.7 mmol/L <HH> (12-19-18 @ 04:08)  Lactate, Blood: 8.9 mmol/L <HH> (12-19-18 @ 00:20)  Troponin T, Serum: 0.02 ng/mL <H> (12-18-18 @ 17:20)  Creatine Kinase, Serum: 580 U/L <H> (12-18-18 @ 17:20)    CARDIAC MARKERS ( 18 Dec 2018 17:20 )  x     / 0.02 ng/mL / 580 U/L / x     / x          RADIOLOGY:  < from: Xray Chest 1 View- PORTABLE-Routine (12.19.18 @ 05:37) >  Stable large right pleural effusion/opacity.    < end of copied text >      PHYSICAL EXAM:  GEN: No acute distress, on high flow oxygen,   LUNGS: decreased breath sounds in the right lower lobe, no crackles or wheezes appreciated.   HEART: S1/S2 present. RRR.   ABD: mildly tender, distended, bowel sounds present  EXT: B/L LE 3+ pitting edema, skin intact.   NEURO: AAOX3,     Intravenous access: 2 peripherals IVs, 1 central chemo port catheter  NG tube: No SUBJECTIVE:    Patient is a 48y old Male with PMH of colon cancer with metastasis to liver and lungs presenting with a chief complaint of SOB (19 Dec 2018 08:27)    Currently admitted to medicine with the primary diagnosis of SOB (shortness of breath)      Today is hospital day 2d.   Overnight, there were no acute event. This morning, patient is on high flow oxygen, resting comfortably in bed and reports no pain or any signs of distress.     PAST MEDICAL & SURGICAL HISTORY  Cirrhosis  Colon cancer  No significant past surgical history    SOCIAL HISTORY:  1 PPD smoker for 10-15 yrs, stopped in 2015 as per pt.   denies alcohol consumption and illicit drug use.     ALLERGIES:  Allergy Status Unknown    MEDICATIONS:  STANDING MEDICATIONS  cefTRIAXone   IVPB      cefTRIAXone   IVPB 1 Gram(s) IV Intermittent every 24 hours  chlorhexidine 4% Liquid 1 Application(s) Topical <User Schedule>  dextrose 5% 1000 milliLiter(s) IV Continuous <Continuous>  heparin  Injectable 5000 Unit(s) SubCutaneous every 8 hours  hydrocortisone sodium succinate Injectable 100 milliGRAM(s) IV Push every 8 hours  norepinephrine Infusion 0.05 MICROgram(s)/kG/Min IV Continuous <Continuous>  pantoprazole    Tablet 40 milliGRAM(s) Oral before breakfast  vancomycin  IVPB        PRN MEDICATIONS  ondansetron Injectable 4 milliGRAM(s) IV Push every 8 hours PRN    VITALS:   T(F): 98.2  HR: 110  BP: 92/55  RR: 20  SpO2: 99%    LABS:                        8.6    9.61  )-----------( 270      ( 19 Dec 2018 04:08 )             26.9     12-19    132<L>  |  93<L>  |  38<H>  ----------------------------<  104<H>  6.5<HH>   |  11<L>  |  2.4<H>    Ca    7.8<L>      19 Dec 2018 04:08  Mg     2.2     12-19    TPro  6.2  /  Alb  1.9<L>  /  TBili  6.6<H>  /  DBili  x   /  AST  2617<H>  /  ALT  611<H>  /  AlkPhos  881<H>  12-19    PT/INR - ( 18 Dec 2018 17:20 )   PT: 22.40 sec;   INR: 1.96 ratio         PTT - ( 18 Dec 2018 17:20 )  PTT:37.7 sec    ABG - ( 19 Dec 2018 08:06 )  pH, Arterial: 7.31  pH, Blood: x     /  pCO2: 24    /  pO2: 85    / HCO3: 12    / Base Excess: -12.3 /  SaO2: 96        Lactate, Blood: 9.7 mmol/L <HH> (12-19-18 @ 04:08)  Lactate, Blood: 8.9 mmol/L <HH> (12-19-18 @ 00:20)  Troponin T, Serum: 0.02 ng/mL <H> (12-18-18 @ 17:20)  Creatine Kinase, Serum: 580 U/L <H> (12-18-18 @ 17:20)    CARDIAC MARKERS ( 18 Dec 2018 17:20 )  x     / 0.02 ng/mL / 580 U/L / x     / x          RADIOLOGY:  < from: Xray Chest 1 View- PORTABLE-Routine (12.19.18 @ 05:37) >  Stable large right pleural effusion/opacity.    < end of copied text >      PHYSICAL EXAM:  GEN: No acute distress, on high flow oxygen,   LUNGS: decreased breath sounds in the right lower lobe, no crackles or wheezes appreciated.   HEART: S1/S2 present. RRR.   ABD: non-tender, distended, bowel sounds present  EXT: B/L LE 3+ pitting edema, skin intact.   NEURO: AAOX3,     Intravenous access: 2 peripherals IVs, 1 central chemo port   NG tube: No  Morris: In SUBJECTIVE:    Patient is a 48y old Male with PMH of colon cancer with metastasis to liver and lungs presenting with a chief complaint of SOB (19 Dec 2018 08:27)    Currently admitted to medicine with the primary diagnosis of SOB (shortness of breath)      Today is hospital day 2d.   Overnight, there were no acute events. This morning, patient is on high flow oxygen, uncomfortable. He is complaining of penile pain    PAST MEDICAL & SURGICAL HISTORY  Cirrhosis  Colon cancer  Intrahepatic pump    SOCIAL HISTORY:  1 PPD smoker for 10-15 yrs, stopped in 2015 as per pt.   denies alcohol consumption and illicit drug use.     ALLERGIES:  Allergy Status Unknown    MEDICATIONS:  STANDING MEDICATIONS  cefTRIAXone   IVPB      cefTRIAXone   IVPB 1 Gram(s) IV Intermittent every 24 hours  chlorhexidine 4% Liquid 1 Application(s) Topical <User Schedule>  dextrose 5% 1000 milliLiter(s) IV Continuous <Continuous>  heparin  Injectable 5000 Unit(s) SubCutaneous every 8 hours  hydrocortisone sodium succinate Injectable 100 milliGRAM(s) IV Push every 8 hours  norepinephrine Infusion 0.05 MICROgram(s)/kG/Min IV Continuous <Continuous>  pantoprazole    Tablet 40 milliGRAM(s) Oral before breakfast  vancomycin  IVPB        PRN MEDICATIONS  ondansetron Injectable 4 milliGRAM(s) IV Push every 8 hours PRN    VITALS:   T(F): 98.2  HR: 110  BP: 92/55  RR: 20  SpO2: 99%    LABS:                        8.6    9.61  )-----------( 270      ( 19 Dec 2018 04:08 )             26.9     12-19    132<L>  |  93<L>  |  38<H>  ----------------------------<  104<H>  6.5<HH>   |  11<L>  |  2.4<H>    Ca    7.8<L>      19 Dec 2018 04:08  Mg     2.2     12-19    TPro  6.2  /  Alb  1.9<L>  /  TBili  6.6<H>  /  DBili  x   /  AST  2617<H>  /  ALT  611<H>  /  AlkPhos  881<H>  12-19    PT/INR - ( 18 Dec 2018 17:20 )   PT: 22.40 sec;   INR: 1.96 ratio         PTT - ( 18 Dec 2018 17:20 )  PTT:37.7 sec    ABG - ( 19 Dec 2018 08:06 )  pH, Arterial: 7.31  pH, Blood: x     /  pCO2: 24    /  pO2: 85    / HCO3: 12    / Base Excess: -12.3 /  SaO2: 96        Lactate, Blood: 9.7 mmol/L <HH> (12-19-18 @ 04:08)  Lactate, Blood: 8.9 mmol/L <HH> (12-19-18 @ 00:20)  Troponin T, Serum: 0.02 ng/mL <H> (12-18-18 @ 17:20)  Creatine Kinase, Serum: 580 U/L <H> (12-18-18 @ 17:20)    CARDIAC MARKERS ( 18 Dec 2018 17:20 )  x     / 0.02 ng/mL / 580 U/L / x     / x          RADIOLOGY:  < from: Xray Chest 1 View- PORTABLE-Routine (12.19.18 @ 05:37) >  Stable large right pleural effusion/opacity.    < end of copied text >      PHYSICAL EXAM:  GEN: Patient in distress, on high flow oxygen, scleral icterus and jaundice.   LUNGS: decreased breath sounds in the right lung, no crackles or wheezes appreciated.   HEART: S1/S2 present. RRR.   ABD: non-tender, distended, bowel sounds present. Anasarca.  EXT: B/L LE 3+ pitting edema, skin intact.    NEURO: AAOX3, moves all extremitites    Intravenous access: 2 peripherals IVs, 1 central chemo port   NG tube: No  Morris: In

## 2018-12-19 NOTE — PROGRESS NOTE ADULT - ASSESSMENT
1- colon cancer with met to liver and lung... recent tx with failed IP tx.   End stage... pt wishes aggressive care.  In ICU on pressor and BIPAP    2- respiratory distress with lung opacificaiton  on antibx    3- sepsis with low bp on antibx    4 LFT"S ... due to liver failure.    5 anemia due to chronic dx

## 2018-12-19 NOTE — PROCEDURE NOTE - PROCEDURE
<<-----Click on this checkbox to enter Procedure Insertion of hemodialysis catheter in adult  12/19/2018    Active  [id number]

## 2018-12-19 NOTE — CONSULT NOTE ADULT - SUBJECTIVE AND OBJECTIVE BOX
Patient is a 48y old  Male who presents with a chief complaint of SOB (18 Dec 2018 20:49)      HPI:  47 y/o male with pmhx of colon CA metastasized to liver and recently lung as per Patients sister. Patient states he has had worsening SOB over the last 3-4 days, not associated with chest pain, coughing. Patient is normally not on home O2. Patient denies ever having paracentesis. Patient was receiving chemotherapy at [facility] but has not since august where [facility] gave him a poor prognosis and an estimate of 5-6 months. They recommended hospice at that time and patient decided to transfer his care to Dr. Mary. As per patient and family Dr. Mary stated he is not a candidate for further chemotherapy or any other intervention as he would not be able to tolerate. He currently has an intrahepatic pump in place as per patients family. (18 Dec 2018 20:49)      PAST MEDICAL & SURGICAL HISTORY:  Cirrhosis  Colon cancer  No significant past surgical history      SOCIAL HX:   Smoking      X smoker                    ETOH         No                  Other    FAMILY HISTORY:  No pertinent family history in first degree relatives  :  No known cardiovacular family hisotry     ROS:  See HPI     Allergies    Allergy Status Unknown    Intolerances          PHYSICAL EXAM    ICU Vital Signs Last 24 Hrs  T(C): 36.8 (19 Dec 2018 08:00), Max: 36.8 (19 Dec 2018 08:00)  T(F): 98.2 (19 Dec 2018 08:00), Max: 98.2 (19 Dec 2018 08:00)  HR: 118 (19 Dec 2018 08:00) (102 - 122)  BP: 90/60 (19 Dec 2018 08:00) (68/47 - 110/57)  BP(mean): 73 (19 Dec 2018 08:00) (56 - 84)  ABP: --  ABP(mean): --  RR: 28 (19 Dec 2018 08:00) (18 - 28)  SpO2: 97% (19 Dec 2018 08:00) (95% - 100%)      General: In moderate respiratory distress   HEENT:  HORTENCIA              Lymphatic system: No cervical LN   Lungs: Decreased BS right side   Cardiovascular: Regular  Gastrointestinal: Soft, Positive BS  Musculoskeletal: No clubbing.  Moves all extremities.  Full range of motion   Skin: Warm.  Intact  Neurological: No motor or sensory deficit       12-18-18 @ 07:01  -  12-19-18 @ 07:00  --------------------------------------------------------  IN:    norepinephrine Infusion: 497.5 mL  Total IN: 497.5 mL    OUT:  Total OUT: 0 mL    Total NET: 497.5 mL      12-19-18 @ 07:01  -  12-19-18 @ 08:27  --------------------------------------------------------  IN:    norepinephrine Infusion: 63.5 mL  Total IN: 63.5 mL    OUT:  Total OUT: 0 mL    Total NET: 63.5 mL          LABS:                          8.6    9.61  )-----------( 270      ( 19 Dec 2018 04:08 )             26.9                                               12-19    132<L>  |  93<L>  |  38<H>  ----------------------------<  104<H>  6.5<HH>   |  11<L>  |  2.4<H>        Ca    7.8<L>      19 Dec 2018 04:08  Mg     2.2     12-19    TPro  6.2  /  Alb  1.9<L>  /  TBili  6.6<H>  /  DBili  x   /  AST  2617<H>  /  ALT  611<H>  /  AlkPhos  881<H>  12-19      PT/INR - ( 18 Dec 2018 17:20 )   PT: 22.40 sec;   INR: 1.96 ratio         PTT - ( 18 Dec 2018 17:20 )  PTT:37.7 sec                                           CARDIAC MARKERS ( 18 Dec 2018 17:20 )  x     / 0.02 ng/mL / 580 U/L / x     / x                                                LIVER FUNCTIONS - ( 19 Dec 2018 04:08 )  Alb: 1.9 g/dL / Pro: 6.2 g/dL / ALK PHOS: 881 U/L / ALT: 611 U/L / AST: 2617 U/L / GGT: x                                                                                                                                   ABG - ( 19 Dec 2018 02:56 )  pH, Arterial: 7.31  pH, Blood: x     /  pCO2: 25    /  pO2: 85    / HCO3: 13    / Base Excess: -11.8 /  SaO2: 78    Lac 10.   on 60% FiO2              X-Rays         Large right sided density                                                                             ECHO    MEDICATIONS  (STANDING):  cefTRIAXone   IVPB      cefTRIAXone   IVPB 1 Gram(s) IV Intermittent every 24 hours  chlorhexidine 4% Liquid 1 Application(s) Topical <User Schedule>  heparin  Injectable 5000 Unit(s) SubCutaneous every 8 hours  norepinephrine Infusion 0.05 MICROgram(s)/kG/Min (5.292 mL/Hr) IV Continuous <Continuous>  pantoprazole    Tablet 40 milliGRAM(s) Oral before breakfast  vancomycin  IVPB        MEDICATIONS  (PRN):  ondansetron Injectable 4 milliGRAM(s) IV Push every 8 hours PRN Nausea and/or Vomiting

## 2018-12-19 NOTE — PROGRESS NOTE ADULT - SUBJECTIVE AND OBJECTIVE BOX
HPI    this is 49 yo male with hx of met colon cancer to liver and lung  POD on chemo... currently IP chemo in [facility] with POD  admitted for SOB .. Pt found to complete opacification of right lung.  Pt is currently in ICU and in BIPAP with pressure support.   Pt currently takes vanco antibx.  Pt was approached for hospice by [facility] oncology, and pt currently  wishes to receive aggressive care.  Pt denies fever and chill  Currently undergoing catheter line insertion    PMHX  cirrhosis    ALLERGY  NKDA    SOCIAL hx   no tobacco  no ETOH    PE    VSS on pressure  HEENT bipap  CHEST no wheez  HEART nl s1s2  ABD  distended, tense  EXT no c/c/e    LAB   wbc 9k  hb 8.6  platelet 270  creat 2.4  elev LFT's

## 2018-12-19 NOTE — PROCEDURE NOTE - NSPOSTCAREGUIDE_GEN_A_CORE
Care for catheter as per unit/ICU protocols/Verbal/written post procedure instructions were given to patient/caregiver/Instructed patient/caregiver regarding signs and symptoms of infection/Keep the cast/splint/dressing clean and dry

## 2018-12-19 NOTE — CONSULT NOTE ADULT - ASSESSMENT
IMPRESSION:    Acute Hypoxemic respiratory failure  Shock   BELIA with hyperkalemia  HO metastatic colon Ca.   Lactic acidosis     PLAN:    CNS: Comfort     HEENT: Oral care    PULMONARY:  HOB @ 45 degrees.  Trial Of BiPAP.  CT Chest NC    CARDIOVASCULAR: ECHO>  IVF with NaHCO3    GI: GI prophylaxis.  NPO     RENAL:  Follow up lytes.  Correct as needed.  CA insulin. Morris. Kidney US     INFECTIOUS DISEASE: Follow up cultures.  Continue ABX     HEMATOLOGICAL:  DVT prophylaxis.  LE Duplex     ENDOCRINE:  Follow up FS.  Insulin protocol if needed.   mg Q8.      MUSCULOSKELETAL:    Prognosis Dismal.  If CT with no reversible finding (ie effusion), Favor supportive measures only

## 2018-12-19 NOTE — PROCEDURE NOTE - NSINFORMCONSENT_GEN_A_CORE
patient's sister/Benefits, risks, and possible complications of procedure explained to patient/caregiver who verbalized understanding and gave verbal consent. Benefits, risks, and possible complications of procedure explained to patient/caregiver who verbalized understanding and gave written consent./patient's sister

## 2018-12-19 NOTE — CONSULT NOTE ADULT - ASSESSMENT
49 y/o male with pmhx of colon CA metastasized to liver and recently lung as per Patients sister.     Now w/ worsening BELIA oligoanuric , rising LFT's and lactate, in shock on Levophed  Likely MODS    discussed w/ family at length how RRT would not change overall prognosis and would only serve to delay the inevitable nonthless they say that would like everything done" even if there was "as mall chance would buy more time"     - He is too unstable to attempt iHD, can provide CVVH which would more safely provide clearance, though again it will not change overall prognosis   -2 L replacement fluid , run net even   -check BMP and phos at least Q12 while on CVVH  - consider palliative care eval      Discussed w/ Family at length and ICU team

## 2018-12-20 NOTE — PROGRESS NOTE ADULT - SUBJECTIVE AND OBJECTIVE BOX
SUBJECTIVE:    Patient is a 48y old Male who presents with a chief complaint of SOB (20 Dec 2018 09:03)    Currently admitted to medicine with the primary diagnosis of SOB (shortness of breath)     Today is hospital day 2d. Patient had a UDALL placed yesterday by surgery and CVVHD was started but stopped around 3 am because the udall was not working, he did not develop a fever or chills. He is still complaining of penile pain and generalized pain on any movement.    PAST MEDICAL & SURGICAL HISTORY  Cirrhosis  Colon cancer  No significant past surgical history    SOCIAL HISTORY:  Negative for smoking/alcohol/drug use.     ALLERGIES:  Allergy Status Unknown    MEDICATIONS:  STANDING MEDICATIONS  ALBUTerol    0.083%. 12 milliGRAM(s) Nebulizer once  cefTRIAXone   IVPB      cefTRIAXone   IVPB 1 Gram(s) IV Intermittent every 24 hours  chlorhexidine 4% Liquid 1 Application(s) Topical <User Schedule>  CRRT Treatment    <Continuous>  dextrose 5% 1000 milliLiter(s) IV Continuous <Continuous>  dextrose 5% 1000 milliLiter(s) IV Continuous <Continuous>  heparin  Injectable 5000 Unit(s) SubCutaneous every 8 hours  hydrocortisone sodium succinate Injectable 100 milliGRAM(s) IV Push every 8 hours  midodrine 10 milliGRAM(s) Oral every 8 hours  norepinephrine Infusion 0.05 MICROgram(s)/kG/Min IV Continuous <Continuous>  pantoprazole  Injectable 40 milliGRAM(s) IV Push daily  PureFlow Dialysate RFP-400 (K 2 / Ca 3) 5000 milliLiter(s) CRRT <Continuous>  vancomycin  IVPB      vancomycin  IVPB 1500 milliGRAM(s) IV Intermittent every 24 hours    PRN MEDICATIONS  morphine  - Injectable 2 milliGRAM(s) IV Push every 4 hours PRN  ondansetron Injectable 4 milliGRAM(s) IV Push every 8 hours PRN    VITALS:   T(F): 95.5  HR: 100  BP: 96/63  RR: 16  SpO2: 100%    LABS:                        9.1    11.63 )-----------( 189      ( 20 Dec 2018 03:30 )             28.3     12-20    130<L>  |  90<L>  |  37<H>  ----------------------------<  125<H>  6.2<HH>   |  10<L>  |  2.7<H>    Ca    7.6<L>      20 Dec 2018 03:30  Phos  7.2     12-20  Mg     2.1     12-20    TPro  5.9<L>  /  Alb  1.9<L>  /  TBili  7.4<H>  /  DBili  x   /  AST  3118<H>  /  ALT  844<H>  /  AlkPhos  842<H>  12-20    PT/INR - ( 18 Dec 2018 17:20 )   PT: 22.40 sec;   INR: 1.96 ratio         PTT - ( 18 Dec 2018 17:20 )  PTT:37.7 sec    ABG - ( 20 Dec 2018 01:07 )  pH, Arterial: 7.28  pH, Blood: x     /  pCO2: 22    /  pO2: 83    / HCO3: 10    / Base Excess: -14.5 /  SaO2: 94                Lactate, Blood: 12.6 mmol/L <HH> (12-20-18 @ 03:30)  Lactate, Blood: 11.4 mmol/L <HH> (12-19-18 @ 17:11)      Culture - Blood (collected 18 Dec 2018 17:01)  Source: .Blood Blood  Preliminary Report (19 Dec 2018 23:02):    No growth to date.    Culture - Blood (collected 18 Dec 2018 17:01)  Source: .Blood Blood  Preliminary Report (19 Dec 2018 23:02):    No growth to date.      CARDIAC MARKERS ( 18 Dec 2018 17:20 )  x     / 0.02 ng/mL / 580 U/L / x     / x          RADIOLOGY:    I- EXAM:  XR CHEST PORTABLE ROUTINE 1V            PROCEDURE DATE:  12/20/2018      INTERPRETATION:  Clinical History / Reason for exam: Abnormal chest   sounds.    Comparison : Chest radiograph prior day.    Technique/Positioning: Frontal portable.    Findings:    Support devices: There is a right-sided Mediport. The catheter tip   retracts into the subclavian vein and lies with its tip in the   brachiocephalic vein.    Cardiac/mediastinum/hilum: Unremarkable.    Lung parenchyma/Pleura: There is a right-sided pleural effusion with   underlying mass.    Skeleton/soft tissues: Unchanged.    Impression:      Right-sided pleural effusion with underlying mass. Support devices as   described.    Unchanged.    II- EXAM:  CT CHEST          PROCEDURE DATE:  12/19/2018      INTERPRETATION:  Reason for Exam:  Shortness of breath. History of   metastatic colon cancer.    Technique: CT of the chest was performed from the thoracic inlet to the   level of the adrenal glands without contrast injection. Coronal and   sagittal images have been submitted.    Comparison: None available.    Findings:     Tubes/Lines: Right-sided Mediport. Partially visualized abdominal stents.    Lungs, Pleura, and Airways: Markedly elevated right hemidiaphragm,   resulting in low volume of the right lung. Small-to-moderate right   pleural effusion, associated with atelectasis of the right lower lobe and   additional right-sided compressive atelectasis. Innumerable bilateral   pulmonary nodules, consistent with patient's history of metastatic   disease, up to 0.8 cm on the right (2:19) and up to 1.4 cm on the left   (2:27).    No pneumothorax. Patent central tracheobronchial tree.    Mediastinum/Lymph Nodes: No bulky adenopathy.    Heart/Great Vessels: Normal heart size. No pericardial effusion.    Visualized upper abdomen: Liver cannot be well evaluated due to lack of   IV contrast. No discrete liver lesions appreciated.    Bones and soft tissues: No aggressive appearing lytic or blastic lesions.   Partially visualized ventral hernia with a partially visualized bowel.   Partially visualized central pneumobilia    IMPRESSION:    Low right lung volume, secondary to markedly elevated right   hemidiaphragm. Small to moderate right pleural effusion, associated with   atelectasis of the right lower lobe and additional right-sided   compressive atelectasis.    Innumerable bilateral pulmonary nodules, consistent with patient's   history of metastatic disease, up to 0.8 cm on the right and up to 1.4 cm   left.    Suboptimal evaluation of the abdomen on this noncontrast study. Partially   visualized ventral hernia with partially visualized loop of bowel.   Partially visualized abdominal stents and central pneumobilia.   Correlation with clinical and surgical history is advised.    PHYSICAL EXAM:  GEN: Patient is sleeping, jaundice, scleral icterus, in anasarca( pitting edema all over the patient's body), in pain.  LUNGS: Decrease breath sounds in base of right lung. left lung clear  HEART: S1/S2 present. RRR.   ABD: Soft, distended, pitting edema  EXT: NC/NC/NE/2+PP/GUZMAN/Skin Intact. Swelling of the penis.  NEURO: patient is verbal, communicative, moves all extremities, no focal deficits.    Intravenous access: port a cath right side, 2 peripheral IV lines, Miller in left fem but not working  NG tube: None  Morris Catheter: Yes

## 2018-12-20 NOTE — PROGRESS NOTE ADULT - SUBJECTIVE AND OBJECTIVE BOX
Patient is a 48y old  Male who presents with a chief complaint of SOB (20 Dec 2018 08:02)        Over Night Events: On BiPAP with 30% )2.  SP CT chest.  On CVVH.  On Levophed 0.5.  [place] not functioning         ROS:  See HPI    PHYSICAL EXAM    ICU Vital Signs Last 24 Hrs  T(C): 35.3 (20 Dec 2018 06:00), Max: 37.3 (19 Dec 2018 12:00)  T(F): 95.6 (20 Dec 2018 06:00), Max: 99.1 (19 Dec 2018 12:00)  HR: 100 (20 Dec 2018 07:45) (76 - 116)  BP: 119/62 (20 Dec 2018 07:45) (77/59 - 156/113)  BP(mean): 89 (20 Dec 2018 07:45) (60 - 140)  ABP: --  ABP(mean): --  RR: 13 (20 Dec 2018 07:45) (9 - 33)  SpO2: 100% (20 Dec 2018 07:45) (94% - 100%)      General: in NAD on BiPAP   HEENT: HORTENCIA             Lymphatic system: No cervical LN   Lungs: Dec BS right base  Cardiovascular: Regular   Gastrointestinal: Soft, Positive BS  Extremities: No clubbing.  Moves extremities.  Full Range of motion   Skin: Warm, intact.  Anasarca  Neurological: No motor or sensory deficit       12-19-18 @ 07:01  -  12-20-18 @ 07:00  --------------------------------------------------------  IN:    dextrose 5%: 500 mL    dextrose 5%: 1200 mL    IV PiggyBack: 800 mL    norepinephrine Infusion: 1571.5 mL  Total IN: 4071.5 mL    OUT:    Indwelling Catheter - Urethral: 80 mL    Other: 439 mL  Total OUT: 519 mL    Total NET: 3552.5 mL          LABS:                            9.1    11.63 )-----------( 189      ( 20 Dec 2018 03:30 )             28.3                                               12-20    130<L>  |  90<L>  |  37<H>  ----------------------------<  125<H>  6.2<HH>   |  10<L>  |  2.7<H>    Ca    7.6<L>      20 Dec 2018 03:30  Phos  7.2     12-20  Mg     2.1     12-20    TPro  5.9<L>  /  Alb  1.9<L>  /  TBili  7.4<H>  /  DBili  x   /  AST  3118<H>  /  ALT  844<H>  /  AlkPhos  842<H>  12-20      PT/INR - ( 18 Dec 2018 17:20 )   PT: 22.40 sec;   INR: 1.96 ratio         PTT - ( 18 Dec 2018 17:20 )  PTT:37.7 sec                                           CARDIAC MARKERS ( 18 Dec 2018 17:20 )  x     / 0.02 ng/mL / 580 U/L / x     / x                                                LIVER FUNCTIONS - ( 20 Dec 2018 03:30 )  Alb: 1.9 g/dL / Pro: 5.9 g/dL / ALK PHOS: 842 U/L / ALT: 844 U/L / AST: 3118 U/L / GGT: x                                                  Culture - Blood (collected 18 Dec 2018 17:01)  Source: .Blood Blood  Preliminary Report (19 Dec 2018 23:02):    No growth to date.    Culture - Blood (collected 18 Dec 2018 17:01)  Source: .Blood Blood  Preliminary Report (19 Dec 2018 23:02):    No growth to date.                                                                                       ABG - ( 20 Dec 2018 01:07 )  pH, Arterial: 7.28  pH, Blood: x     /  pCO2: 22    /  pO2: 83    / HCO3: 10    / Base Excess: -14.5 /  SaO2: 94  Lac 12.6               MEDICATIONS  (STANDING):  ALBUTerol    0.083%. 12 milliGRAM(s) Nebulizer once  cefTRIAXone   IVPB      cefTRIAXone   IVPB 1 Gram(s) IV Intermittent every 24 hours  chlorhexidine 4% Liquid 1 Application(s) Topical <User Schedule>  CRRT Treatment    <Continuous>  dextrose 5% 1000 milliLiter(s) (1000 mL/Hr) IV Continuous <Continuous>  dextrose 5% 1000 milliLiter(s) (100 mL/Hr) IV Continuous <Continuous>  heparin  Injectable 5000 Unit(s) SubCutaneous every 8 hours  hydrocortisone sodium succinate Injectable 100 milliGRAM(s) IV Push every 8 hours  norepinephrine Infusion 0.05 MICROgram(s)/kG/Min (5.292 mL/Hr) IV Continuous <Continuous>  pantoprazole  Injectable 40 milliGRAM(s) IV Push daily  PureFlow Dialysate RFP-400 (K 2 / Ca 3) 5000 milliLiter(s) (2000 mL/Hr) CRRT <Continuous>  vancomycin  IVPB      vancomycin  IVPB 1500 milliGRAM(s) IV Intermittent every 24 hours    MEDICATIONS  (PRN):  morphine  - Injectable 2 milliGRAM(s) IV Push every 4 hours PRN Moderate Pain (4 - 6)  ondansetron Injectable 4 milliGRAM(s) IV Push every 8 hours PRN Nausea and/or Vomiting      Xrays:         Unchanged.                                                                             ECHO

## 2018-12-20 NOTE — PROGRESS NOTE ADULT - SUBJECTIVE AND OBJECTIVE BOX
Patient was seen and examined. Spoke with RN. Chart reviewed.  Pt doing very porly - family at bedside  Failed CVVHD  on BiPap  lethargic  Not responsive      Vital Signs Last 24 Hrs  T(F): 95.5 (20 Dec 2018 08:00), Max: 99.1 (19 Dec 2018 12:00)  HR: 96 (20 Dec 2018 08:45) (76 - 116)  BP: 97/61 (20 Dec 2018 08:45) (83/48 - 156/113)  SpO2: 100% (20 Dec 2018 08:45) (94% - 100%)  MEDICATIONS  (STANDING):  ALBUTerol    0.083%. 12 milliGRAM(s) Nebulizer once  cefTRIAXone   IVPB      cefTRIAXone   IVPB 1 Gram(s) IV Intermittent every 24 hours  chlorhexidine 4% Liquid 1 Application(s) Topical <User Schedule>  CRRT Treatment    <Continuous>  dextrose 5% 1000 milliLiter(s) (1000 mL/Hr) IV Continuous <Continuous>  dextrose 5% 1000 milliLiter(s) (100 mL/Hr) IV Continuous <Continuous>  heparin  Injectable 5000 Unit(s) SubCutaneous every 8 hours  hydrocortisone sodium succinate Injectable 100 milliGRAM(s) IV Push every 8 hours  midodrine 10 milliGRAM(s) Oral every 8 hours  norepinephrine Infusion 0.05 MICROgram(s)/kG/Min (5.292 mL/Hr) IV Continuous <Continuous>  pantoprazole  Injectable 40 milliGRAM(s) IV Push daily  PureFlow Dialysate RFP-400 (K 2 / Ca 3) 5000 milliLiter(s) (2000 mL/Hr) CRRT <Continuous>  vancomycin  IVPB      vancomycin  IVPB 1500 milliGRAM(s) IV Intermittent every 24 hours    MEDICATIONS  (PRN):  morphine  - Injectable 2 milliGRAM(s) IV Push every 4 hours PRN Moderate Pain (4 - 6)  ondansetron Injectable 4 milliGRAM(s) IV Push every 8 hours PRN Nausea and/or Vomiting    Labs:                        9.1    11.63 )-----------( 189      ( 20 Dec 2018 03:30 )             28.3                         8.6    9.61  )-----------( 270      ( 19 Dec 2018 04:08 )             26.9     20 Dec 2018 03:30    130    |  90     |  37     ----------------------------<  125    6.2     |  10     |  2.7    19 Dec 2018 17:11    131    |  89     |  41     ----------------------------<  71     6.9     |  12     |  2.8      Ca    7.6        20 Dec 2018 03:30  Ca    8.1        19 Dec 2018 17:11  Phos  7.2       20 Dec 2018 03:30  Mg     2.1       20 Dec 2018 03:30  Mg     2.2       19 Dec 2018 04:08    TPro  5.9    /  Alb  1.9    /  TBili  7.4    /  DBili  x      /  AST  3118   /  ALT  844    /  AlkPhos  842    20 Dec 2018 03:30  TPro  6.2    /  Alb  1.9    /  TBili  6.6    /  DBili  x      /  AST  2617   /  ALT  611    /  AlkPhos  881    19 Dec 2018 04:08    PT/INR - ( 18 Dec 2018 17:20 )   PT: 22.40 sec;   INR: 1.96 ratio         PTT - ( 18 Dec 2018 17:20 )  PTT:37.7 sec      Culture - Blood (collected 18 Dec 2018 17:01)  Source: .Blood Blood  Preliminary Report (19 Dec 2018 23:02):    No growth to date.    Culture - Blood (collected 18 Dec 2018 17:01)  Source: .Blood Blood  Preliminary Report (19 Dec 2018 23:02):    No growth to date.      General: comfortable, NAD  Neurology: lethargic  Head:  Normocephalic, atraumatic  ENT:  Mucosa moist  Neck:  Supple, no JVD,   Skin: no breakdowns (as per RN)  Resp: decreases sounds  CV: RRR, S1S2,   GI: distended  MS: sequentials      A/P:Assessment and Plan:   · Assessment		  Patient is a 48y old Male with PMH of colon cancer with metastasis to liver and lungs admitted to the ICU with a chief complaint of SOB, coughing and chest pain.     1- Sepsis/Resp failure  - c/w oxygen high flow  - BIPAP ON   - LE duplex: no evidence of DVT  - c/w abx rec as per ID ( ceftriaxone and vancomycin)  - Advance directives to be discussed  -pulm seeing pt  - IV fluid    2- BELIA with hyperkalemia  - Jay in today (12/19)  - start Bicarb drip  - f/u potassium levels,  - F/up Renal US  - Start hydrocortisone 100mg q8.   - consider HD    3- Penile pain:  - Swelling of shaft of penis, glans of penis invaginated in the skin with jay in.  - Call urology for f/up and recommendations    DVT PPX: Heparin SubQ  GI PPX: Protonix 40  mg qd  Activity: Bedrest  Diet: NPO  Disposition: MICU monitoring  Code: Full Code for now, discuss advance directives with famil at length.     Very poor prognosis          DVT prophylaxis  Decubitus prevention- all measures as per RN protocol  Please call or text me with any questions or updates

## 2018-12-20 NOTE — PHARMACOTHERAPY INTERVENTION NOTE - COMMENTS
plan wean norepinephrine drip, currently @ 0.55 mcg/kg/min, new order for midodrine 5mg q8h, recommended dose of 10mg po q8h, with 1st dose stat

## 2018-12-20 NOTE — PROGRESS NOTE ADULT - ASSESSMENT
1- colon cancer with met to liver and lung... recent tx with failed IP tx.   End stage... pt wishes aggressive care initially.  In ICU on pressor and BIPAP    now high dose pressor and palliative care to start    2- respiratory distress with lung opacificaiton  on antibx    3- sepsis with low bp on antibx    4 hepatorenal syndrome with ARF with elev LFT"S ...   hyperkalemia.  informed family and there is no benefit from HD.     5 anemia due to chronic dx

## 2018-12-20 NOTE — PROGRESS NOTE ADULT - ASSESSMENT
Patient is a 48y old Male with PMH of colon cancer with metastasis to liver and lungs admitted to the ICU with a chief complaint of SOB, coughing and chest pain.     1- SOB likely secondary to suspected GN pneumonia vs lung metastases:   - continue BIPAP for comfort  - CT non contrast: Multiple lung nodules consistent with metastases and right moderate pleural effusion with right hemidiaphragm elevation.  - LE duplex: no evidence of DVT  - c/w abx rec as per ID ( ceftriaxone and vancomycin)  - F/up Echo  - F/up with surgery regarding the ventral hernia  - Plan is to put a [place] in IJ, hemodialyze the patient and then downgrade to floor  - F/up palliative care recs  - Advance directives to be discussed    2- BELIA with hyperkalemia  - Creatinine is increasing even after some hours of CVVHD, K+ slightly improving but still 6.2, lactate 13.  - c/w Bicarb drip  - f/u potassium levels q8h  - Renal US: No hydronephrosis bilaterally  - c/w hydrocortisone 100 mg q8h    3- Penile pain:  - Swelling of shaft of penis, glans of penis invaginated in the skin with jay in.  - Urology recs: Attach jay to abdomen and put heating pad under the testes--> this did not work    # DVT PPX: Heparin SubQ  # GI PPX: Protonix 40  mg qd IV  # Activity: Bedrest  # Diet: NPO  # Disposition: Downgrade to floor after hemodialysis  # Code: Full Code for now, discuss advance directives

## 2018-12-20 NOTE — PROGRESS NOTE ADULT - ASSESSMENT
47 y/o male with pmhx of  metastatic colon CA  Now w/ worsening BELIA oligoanuric , rising LFT's and lactate, in shock on Levophed  # continue cvvhd , will try to switch to HD if ok with ICU team  # remains oligoanuric, on 1 pressor  # k noted, give d50 and insulin  # check vanco level  # continue bicarbonate drip  # overall prognosis poor

## 2018-12-20 NOTE — PROVIDER CONTACT NOTE (MEDICATION) - ACTION/TREATMENT ORDERED:
Dr. Bob to consult with senior about intervention; plan of care. Will follow up and continue to monitor.

## 2018-12-20 NOTE — GOALS OF CARE CONVERSATION - PERSONAL ADVANCE DIRECTIVE - CONVERSATION DETAILS
Palliative Medicine, and ICU MD met with family, discussed person current condition, overall poor prognosis.  Family aware that patients condition is poor.  Family reports patient's brother is coming from [place] and will be here on Sunday.  Family would like to continue with medical management including pressor support, bicarb drip.  DNR/DNI.

## 2018-12-20 NOTE — CONSULT NOTE ADULT - SUBJECTIVE AND OBJECTIVE BOX
REQUESTED OF: DR Girard    CLINICAL ISSUE TO BE EVALUATED BY CONSULTANT: Goals of care        FAUSTINO PIERRE 48yMale  HPI:  49 y/o male with pmhx of colon CA metastasized to liver and recently lung as per Patients sister. Patient states he has had worsening SOB over the last 3-4 days, not associated with chest pain, coughing. Patient is normally not on home O2. Patient denies ever having paracentesis. Patient was receiving chemotherapy at [place] but has not since august where [facility] gave him a poor prognosis and an estimate of 5-6 months. They recommended hospice at that time and patient decided to transfer his care to Dr. Mary. As per patient and family Dr. Mary stated he is not a candidate for further chemotherapy or any other intervention as he would not be able to tolerate. He currently has an intrahepatic pump in place as per patients family. (18 Dec 2018 20:49)      Pt seen in ICU, lethargic, minimally communicative, no noted pain/distress. On high flow 02, hypotensive despite levo gtt.       PAST MEDICAL & SURGICAL HISTORY:  Cirrhosis  Colon cancer  No significant past surgical history        PHYSICAL EXAM:  Adult man, chronically/critically ill  PERRL, +icterus  RRR  Abdom distended  ++anasarca  No focal deficits      T(C): 35.1, Max: 36.3 (20:00)  HR: 110 (76 - 112)  BP: 90/60 (75/56 - 156/113)  RR: 14 (9 - 33)  SpO2: 100% (94% - 100%)      LABS/STUDIES:  12-20    130<L>  |  90<L>  |  37<H>  ----------------------------<  125<H>  6.2<HH>   |  10<L>  |  2.7<H>    Ca    7.6<L>      20 Dec 2018 03:30  Phos  7.2     12-20  Mg     2.1     12-20    TPro  5.9<L>  /  Alb  1.9<L>  /  TBili  7.4<H>  /  DBili  x   /  AST  3118<H>  /  ALT  844<H>  /  AlkPhos  842<H>  12-20                            9.1    11.63 )-----------( 189      ( 20 Dec 2018 03:30 )             28.3       MEDICATIONS  (STANDING):  ALBUTerol    0.083%. 12 milliGRAM(s) Nebulizer once  cefTRIAXone   IVPB      cefTRIAXone   IVPB 1 Gram(s) IV Intermittent every 24 hours  chlorhexidine 4% Liquid 1 Application(s) Topical <User Schedule>  CRRT Treatment    <Continuous>  dextrose 5% 1000 milliLiter(s) (1000 mL/Hr) IV Continuous <Continuous>  dextrose 5% 1000 milliLiter(s) (100 mL/Hr) IV Continuous <Continuous>  heparin  Injectable 5000 Unit(s) SubCutaneous every 8 hours  hydrocortisone sodium succinate Injectable 100 milliGRAM(s) IV Push every 8 hours  midodrine 10 milliGRAM(s) Oral every 8 hours  norepinephrine Infusion 0.05 MICROgram(s)/kG/Min (5.292 mL/Hr) IV Continuous <Continuous>  pantoprazole  Injectable 40 milliGRAM(s) IV Push daily  PureFlow Dialysate RFP-400 (K 2 / Ca 3) 5000 milliLiter(s) (2000 mL/Hr) CRRT <Continuous>  vancomycin  IVPB      vancomycin  IVPB 1500 milliGRAM(s) IV Intermittent every 24 hours    MEDICATIONS  (PRN):  morphine  - Injectable 2 milliGRAM(s) IV Push every 4 hours PRN Moderate Pain (4 - 6)  ondansetron Injectable 4 milliGRAM(s) IV Push every 8 hours PRN Nausea and/or Vomiting        [place] Symptom Assesment Scale      PPS (Palliative Performance Scale): 10

## 2018-12-20 NOTE — PROGRESS NOTE ADULT - SUBJECTIVE AND OBJECTIVE BOX
HPI    this is 47 yo male with hx of met colon cancer to liver and lung  POD on chemo... currently IP chemo in [facility] with POD  admitted for SOB .. Pt found to complete opacification of right lung.  Pt is currently in ICU and in BIPAP with pressure support.   Pt currently takes vanco antibx.  Pt was approached for hospice by [facility] oncology, and pt currently  wishes to receive aggressive care.  Pt denies fever and chill  Currently undergoing catheter line insertion    Pt lethargy  on bipap with high dose pressor     PE    VSS on pressure  HEENT bipap  CHEST no wheez  HEART nl s1s2  ABD  distended, tense  EXT no c/c/e    LAB   wbc 9k..11k   hb 8.6..9.1  platelet 189  creat 2.4..2.7   elev LFT's AST 3118    K 6.2

## 2018-12-20 NOTE — CONSULT NOTE ADULT - ASSESSMENT
48yMale being evaluated for goals of care.    Met w pt's sister who is next of kin (pt never ) in presence of extended family. CLinical condition reviewed in presence of ICU team, grave prognosis understood.    Palliative Care services introduced.   Family aware pt nearing end of life. At this time, the goal is to continue current medical management with no escalation in care with hopes pt's brother will [place] from [place] to see pt by Sunday. Family aware pt may continue to clinically decline over next 24-48hrs and death may be imminent despite all medical efforts. Plan is to cont levo gtt (with no titration) and high flow support for now.   Cont to attempt to correct electrolytes as clinically indicated.    DNR/DNI status in place  Family aware pt too unstable to continue pursuit of new [place] or HD    35minutes spent discussing advanced care planning  All questions answered in detail  Support provided    Recommendations:  Goals of care as above  Pt for transfer to medical floor (private ) with continued palliative care f/u  morphine 2mg IV q2h PRN pain or resp distress      We will follow  [phone]

## 2018-12-20 NOTE — CHART NOTE - NSCHARTNOTEFT_GEN_A_CORE
Palliative Care Biopsychosocial Assessment:     Pt is a 47 y/o single muslum male admitted from home with SOB.  Pt has a PMHx of Metastatic Colon Cancer (liver/lung; septic shock).  Pt was receiving treatment at [facility] up until 5 months ago until it was determined patient was no longer responding to treatment.  Pt transferred to Dr. Johnson who also determine pt would not benefit from CA treatment.  Pt current in and out of consciousness, on high flow, hypothermic, no urine output.    Pt is single, never  no children.  Pt has sister at bedside who is his surrogate.  Pt has two brothers flying in from [place] on Sunday.  Family aware death is imminent.  At this time family wants to continue with ongoing medical management, including pressors, bicarb, NPO, No IV Fluids, No bolus, pt no longer a candidate to CVVH due to low BP.      Provided support to family, pt seen by Valeriano earlier today, comfort cart ordered.    Plan is for transfer to .        Patient Coping Status:       [   ]   coping well        [   ]    coping with  some difficulty       [   ]   difficulty coping     [ x  ]   other                                                       Patient Emotional Status:     [   ]   anxious         [   ]   depressed           [   ]  overwhelmed          [   ]   angry         [   ]accepting       [   ]   not accepting           [ x  ]   other     Patient Mental Status:      [   ]   alert              [   ]   oriented         [    ]   confused         [  x ] lethargic         [   ]   non-responsive   [   ]   other     Advance Directives:     [   ]    Health Care Surrogate: Name:                             [   ]    Health Care Proxy: Name:   [   ]    MOLST  [   ]    Living Will  [x   ]    DNR  [  x ]    DNI    Patient Needs:     [ x  ]   Supportive Counseling                  [   ]   Family Meeting            [   ]    Education                           [   ]   Advance Care Planning         Caregiver Name:   Caregiver needs:     [x   ]   Supportive Counseling      [   ]   Family Conference      [   ]   Education    [   ]   Other     Referral:      [   ]   Community Resources         [   ]   Cancer Support Group     [   ]    Hospice       [   ]   Bereavement support     [ x  ]   Pastoral Care      [   ]  [facility] [place]       [   ]  Child Life Services     [   ]   Other                    Spectra #: [phone]

## 2018-12-20 NOTE — PROGRESS NOTE ADULT - ASSESSMENT
IMPRESSION:    Acute Hypoxemic respiratory failure  Shock   BELIA with hyperkalemia on CVVHD  HO metastatic colon Ca.   Lactic acidosis     PLAN:    CNS: Comfort.  PAlliative care consult.      HEENT: Oral care    PULMONARY:  HOB @ 45 degrees.  Continue BiPAP for comfort. .     CARDIOVASCULAR: FU ECHO. Continue IVF with NaHCO3    GI: GI prophylaxis.  NPO.  Surgery FU     RENAL:  Follow up lytes.  Correct as needed.  CA insulin. [place]. Kidney US.  DW renal.  Possible HD    INFECTIOUS DISEASE: Follow up cultures.  Continue ABX     HEMATOLOGICAL:  DVT prophylaxis.     ENDOCRINE:  Follow up FS.   mg Q8.      MUSCULOSKELETAL:    Prognosis Dismal. Favor supportive measures only.  Escalating care is futile and prolonging the suffering    Transfer to floor

## 2018-12-20 NOTE — PROGRESS NOTE ADULT - SUBJECTIVE AND OBJECTIVE BOX
seen and examined  on 1 pressor  on cvvhd       Standing Inpatient Medications  ALBUTerol    0.083%. 12 milliGRAM(s) Nebulizer once  cefTRIAXone   IVPB      cefTRIAXone   IVPB 1 Gram(s) IV Intermittent every 24 hours  chlorhexidine 4% Liquid 1 Application(s) Topical <User Schedule>  CRRT Treatment    <Continuous>  dextrose 5% 1000 milliLiter(s) IV Continuous <Continuous>  dextrose 5% 1000 milliLiter(s) IV Continuous <Continuous>  heparin  Injectable 5000 Unit(s) SubCutaneous every 8 hours  hydrocortisone sodium succinate Injectable 100 milliGRAM(s) IV Push every 8 hours  insulin regular  human recombinant. 10 Unit(s) IV Push once  norepinephrine Infusion 0.05 MICROgram(s)/kG/Min IV Continuous <Continuous>  pantoprazole  Injectable 40 milliGRAM(s) IV Push daily  PureFlow Dialysate RFP-400 (K 2 / Ca 3) 5000 milliLiter(s) CRRT <Continuous>  vancomycin  IVPB      vancomycin  IVPB 1500 milliGRAM(s) IV Intermittent every 24 hours          VITALS/PHYSICAL EXAM  --------------------------------------------------------------------------------  T(C): 35.3 (12-20-18 @ 06:00), Max: 37.3 (12-19-18 @ 12:00)  HR: 98 (12-20-18 @ 07:15) (76 - 116)  BP: 126/61 (12-20-18 @ 07:15) (77/59 - 156/113)  RR: 12 (12-20-18 @ 07:15) (9 - 33)  SpO2: 100% (12-20-18 @ 07:15) (94% - 100%)  Wt(kg): --  Height (cm): 177.8 (12-18-18 @ 23:00)  Weight (kg): 112.9 (12-18-18 @ 17:41)  BMI (kg/m2): 35.7 (12-18-18 @ 23:00)  BSA (m2): 2.29 (12-18-18 @ 23:00)      12-19-18 @ 07:01  -  12-20-18 @ 07:00  --------------------------------------------------------  IN: 4071.5 mL / OUT: 519 mL / NET: 3552.5 mL      Physical Exam:  	Pulm: B/L srikanth   	CV: S1S2; no rub  	Abd: distended  	: misty   	LE: edema  	Vascular access:    LABS/STUDIES  --------------------------------------------------------------------------------              9.1    11.63 >-----------<  189      [12-20-18 @ 03:30]              28.3     130  |  90  |  37  ----------------------------<  125      [12-20-18 @ 03:30]  6.2   |  10  |  2.7        Ca     7.6     [12-20-18 @ 03:30]      Mg     2.1     [12-20-18 @ 03:30]      Phos  7.2     [12-20-18 @ 03:30]    TPro  5.9  /  Alb  1.9  /  TBili  7.4  /  DBili  x   /  AST  3118  /  ALT  844  /  AlkPhos  842  [12-20-18 @ 03:30]    PT/INR: PT 22.40, INR 1.96       [12-18-18 @ 17:20]  PTT: 37.7       [12-18-18 @ 17:20]    Troponin 0.02      [12-18-18 @ 17:20]        [12-18-18 @ 17:20]    Creatinine Trend:  SCr 2.7 [12-20 @ 03:30]  SCr 2.8 [12-19 @ 17:11]  SCr 2.4 [12-19 @ 04:08]  SCr 2.4 [12-19 @ 00:20]  SCr 2.4 [12-18 @ 17:20]

## 2018-12-20 NOTE — PROGRESS NOTE ADULT - ASSESSMENT
49 y/o male s/p [place] for CVVH    Plan:   Groin check in AM for any signs of bleeding or hematoma

## 2018-12-20 NOTE — CHART NOTE - NSCHARTNOTEFT_GEN_A_CORE
Patient is a 48y old Male with PMH of colon cancer with metastasis to liver and lungs admitted to the ICU for respiratory distress and desaturation. Patient himself and family want full code, he was being treated and followed at [facility] and they gave him a life expectancy of 5 months back in August. He is jaundiced with anasarca and penile swelling. He is sensitive to any movement and light touch( feels pain). CT chest non con showed multiple nodules consistent with metastatic disease, right moderate pleural effusion, right hemidiaphragmatic elevation. He had a McRae placed yesterday and CVVHD( he is hypotensive) was started after but the UDALL stopped working around 3 am on 12/20/18 and he only got some hours of dialysis. He will get a McRae today and get 1 session of hemodialysis and then will be downgraded to regular floor. Palliative care consult was put.  Follow ups:  - Lytes q8h and correct if needed  - Palliative care consult  - Advance directives: many attempts were made to discuss with patient and family about advance directives, they still want FULL code. We hope that palliative care can help us with discussing the issue with the family.  - Patient has peripheral IV lines but since he is in anasarca, some lines are stopping to work. Might need peripheral IV lines with US.  - Surgery follow up for ventral hernia  - Keep Bicarb drip running--> patient is acidotic    1- SOB likely secondary to suspected GN pneumonia vs lung metastases:   - continue BIPAP for comfort  - CT non contrast: Multiple lung nodules consistent with metastases and right moderate pleural effusion with right hemidiaphragm elevation.  - LE duplex: no evidence of DVT  - c/w abx rec as per ID ( ceftriaxone and vancomycin)  - F/up Echo  - F/up with surgery regarding the ventral hernia  - Plan is to put a McRae in IJ, hemodialyze the patient and then downgrade to floor  - F/up palliative care recs  - Advance directives to be discussed    2- BELIA with hyperkalemia  - Creatinine is increasing even after some hours of CVVHD, K+ slightly improving but still 6.2, lactate 13.  - c/w Bicarb drip  - f/u potassium levels q8h  - Renal US: No hydronephrosis bilaterally  - c/w hydrocortisone 100 mg q8h    3- Penile pain:  - Swelling of shaft of penis, glans of penis invaginated in the skin with jay in.  - Urology recs: Attach jay to abdomen and put heating pad under the testes--> this did not work    # DVT PPX: Heparin SubQ  # GI PPX: Protonix 40  mg qd IV  # Activity: Bedrest  # Diet: NPO  # Disposition: Downgrade to floor after hemodialysis  # Code: Full Code for now, discuss advance directives Patient is a 48y old Male with PMH of colon cancer with metastasis to liver and lungs admitted to the ICU for respiratory distress and desaturation. Patient himself and family want full code, he was being treated and followed at [facility] and they gave him a life expectancy of 5 months back in August. He is jaundiced with anasarca and penile swelling. He is sensitive to any movement and light touch( feels pain). CT chest non con showed multiple nodules consistent with metastatic disease, right moderate pleural effusion, right hemidiaphragmatic elevation. He had a Barton placed yesterday and CVVHD( he is hypotensive) was started after but the UDALL stopped working around 3 am on 12/20/18 and he only got some hours of dialysis. He will get a Barton today and get 1 session of hemodialysis and then will be downgraded to regular floor. Palliative care consult was put.  Follow ups:  - Lytes q8h and correct if needed  - Palliative care consult  - DNR/DNI made today  - Patient has peripheral IV lines but since he is in anasarca, some lines are stopping to work. Might need peripheral IV lines with US.  - Surgery follow up for ventral hernia  - Keep Bicarb drip running--> patient is acidotic    1- SOB likely secondary to suspected GN pneumonia vs lung metastases:   - continue BIPAP for comfort  - CT non contrast: Multiple lung nodules consistent with metastases and right moderate pleural effusion with right hemidiaphragm elevation.  - LE duplex: no evidence of DVT  - c/w abx rec as per ID ( ceftriaxone and vancomycin)  - F/up Echo  - F/up with surgery regarding the ventral hernia  - Plan is to put a Barton in IJ, hemodialyze the patient and then downgrade to floor  - F/up palliative care recs  - Advance directives to be discussed    2- BELIA with hyperkalemia  - Creatinine is increasing even after some hours of CVVHD, K+ slightly improving but still 6.2, lactate 13.  - c/w Bicarb drip  - f/u potassium levels q8h  - Renal US: No hydronephrosis bilaterally  - c/w hydrocortisone 100 mg q8h    3- Penile pain:  - Swelling of shaft of penis, glans of penis invaginated in the skin with jay in.  - Urology recs: Attach jay to abdomen and put heating pad under the testes--> this did not work    # DVT PPX: Heparin SubQ  # GI PPX: Protonix 40  mg qd IV  # Activity: Bedrest  # Diet: NPO  # Disposition: Downgrade to floor after hemodialysis  # Code: Full Code for now, discuss advance directives

## 2018-12-20 NOTE — PROGRESS NOTE ADULT - SUBJECTIVE AND OBJECTIVE BOX
FAUSTINO PIERRE  48y Male   [id number]    Procedure/dx: Handy  Events of the Last 24h: s/p Nisula for CVVH    Patient is a 48y old  Male who presents with a chief complaint of SOB (19 Dec 2018 22:12)    PAST MEDICAL & SURGICAL HISTORY:  Cirrhosis  Colon cancer  No significant past surgical history    Vital Signs Last 24 Hrs  T(C): 36.2 (20 Dec 2018 00:00), Max: 37.3 (19 Dec 2018 12:00)  T(F): 97.1 (20 Dec 2018 00:00), Max: 99.1 (19 Dec 2018 12:00)  HR: 88 (20 Dec 2018 02:45) (76 - 122)  BP: 150/67 (20 Dec 2018 02:45) (74/53 - 152/73)  BP(mean): 98 (20 Dec 2018 02:45) (60 - 111)  RR: 17 (20 Dec 2018 02:45) (12 - 33)  SpO2: 100% (20 Dec 2018 02:45) (95% - 100%)    Diet, NPO (12-19-18 @ 03:39)    I&O's Detail    18 Dec 2018 07:01  -  19 Dec 2018 07:00  --------------------------------------------------------  IN:    norepinephrine Infusion: 497.5 mL  Total IN: 497.5 mL    OUT:  Total OUT: 0 mL    Total NET: 497.5 mL    19 Dec 2018 07:01  -  20 Dec 2018 03:10  --------------------------------------------------------  IN:    dextrose 5%: 500 mL    dextrose 5%: 800 mL    IV PiggyBack: 700 mL    norepinephrine Infusion: 1311.5 mL  Total IN: 3311.5 mL    OUT:    Indwelling Catheter - Urethral: 77 mL    Other: 297 mL  Total OUT: 374 mL    Total NET: 2937.5 mL    MEDICATIONS  (STANDING):  ALBUTerol    0.083%. 2.5 milliGRAM(s) Nebulizer once  cefTRIAXone   IVPB      cefTRIAXone   IVPB 1 Gram(s) IV Intermittent every 24 hours  chlorhexidine 4% Liquid 1 Application(s) Topical <User Schedule>  CRRT Treatment    <Continuous>  dextrose 5% 1000 milliLiter(s) (1000 mL/Hr) IV Continuous <Continuous>  dextrose 5% 1000 milliLiter(s) (100 mL/Hr) IV Continuous <Continuous>  heparin  Injectable 5000 Unit(s) SubCutaneous every 8 hours  hydrocortisone sodium succinate Injectable 100 milliGRAM(s) IV Push every 8 hours  norepinephrine Infusion 0.05 MICROgram(s)/kG/Min (5.292 mL/Hr) IV Continuous <Continuous>  pantoprazole    Tablet 40 milliGRAM(s) Oral before breakfast  PureFlow Dialysate RFP-400 (K 2 / Ca 3) 5000 milliLiter(s) (2000 mL/Hr) CRRT <Continuous>  vancomycin  IVPB      vancomycin  IVPB 1500 milliGRAM(s) IV Intermittent every 24 hours    MEDICATIONS  (PRN):  morphine  - Injectable 2 milliGRAM(s) IV Push every 4 hours PRN Moderate Pain (4 - 6)  ondansetron Injectable 4 milliGRAM(s) IV Push every 8 hours PRN Nausea and/or Vomiting      PHYSICAL EXAM:  GROIN: no bleeding noted from the Nisula site                          8.6    9.61  )-----------( 270      ( 19 Dec 2018 04:08 )             26.9        12-19    131<L>  |  89<L>  |  41<H>  ----------------------------<  71  6.9<HH>   |  12<L>  |  2.8<H>    Ca    8.1<L>      19 Dec 2018 17:11  Mg     2.2     12-19    TPro  6.2  /  Alb  1.9<L>  /  TBili  6.6<H>  /  DBili  x   /  AST  2617<H>  /  ALT  611<H>  /  AlkPhos  881<H>  12-19    LIVER FUNCTIONS - ( 19 Dec 2018 04:08 )  Alb: 1.9 g/dL / Pro: 6.2 g/dL / ALK PHOS: 881 U/L / ALT: 611 U/L / AST: 2617 U/L / GGT: x           PT/INR - ( 18 Dec 2018 17:20 )   PT: 22.40 sec;   INR: 1.96 ratio         PTT - ( 18 Dec 2018 17:20 )  PTT:37.7 sec  CARDIAC MARKERS ( 18 Dec 2018 17:20 )  x     / 0.02 ng/mL / 580 U/L / x     / x        Culture - Blood (collected 18 Dec 2018 17:01)  Source: .Blood Blood  Preliminary Report (19 Dec 2018 23:02):    No growth to date.    Culture - Blood (collected 18 Dec 2018 17:01)  Source: .Blood Blood  Preliminary Report (19 Dec 2018 23:02):    No growth to date.    IMAGING:  NONE

## 2018-12-20 NOTE — GOALS OF CARE CONVERSATION - PERSONAL ADVANCE DIRECTIVE - TREATMENT GUIDELINE COMMENT
DNR/DNI continue high-flow,continue bicarb,continue pressors, medical management with no escalation of care.  Family aware death imminent regardless of medical management.  Focus is to keep patient comfortable.   Pt unstable to continue dialysis, will continue to correct electrolyes, vitals Q shift.  Pt will be transferred to medicine service.  Palliative [phone]

## 2018-12-20 NOTE — PROVIDER CONTACT NOTE (MEDICATION) - SITUATION
Dr. Bob made aware patient too lethargic, opens eyes/arousable to touch & repeated stimulation, but unable to safely swallow.

## 2018-12-21 NOTE — PROVIDER CONTACT NOTE (OTHER) - ACTION/TREATMENT ORDERED:
no further intervention at this time, MD Cisneros was made aware of situation, family is at bedside, aware of situation as well; emotional support rendered

## 2018-12-21 NOTE — DISCHARGE NOTE FOR THE EXPIRED PATIENT - HOSPITAL COURSE
49 y/o male with pmhx of colon CA metastasized to liver and recently lung as per Patients sister. Patient states he has had worsening SOB over the last 3-4 days, not associated with chest pain, coughing. Patient is normally not on home O2. Patient denies ever having had a paracentesis. Patient was receiving chemotherapy at [facility] but has not since August where [facility] gave him a poor prognosis and an estimate of 5-6 months. They recommended hospice at that time and patient decided to transfer his care to Dr. Mary. As per patient and family Dr. Mary stated he is not a candidate for further chemotherapy or any other intervention as he would not be able to tolerate. He had an intrahepatic pump in place as per patients family. Patient was put on antibiotics for sepsis; also on BiPAP for respiratory failure. Patient was put on bicarb drip for BELIA. 49 y/o male with pmhx of colon CA metastasized to liver and recently lung as per Patients sister. Patient states he has had worsening SOB over the last 3-4 days, not associated with chest pain, coughing. Patient is normally not on home O2. Patient denies ever having had a paracentesis. Patient was receiving chemotherapy at [facility] but has not since August where [facility] gave him a poor prognosis and an estimate of 5-6 months. They recommended hospice at that time and patient decided to transfer his care to Dr. Mary. As per patient and family Dr. Mary stated he is not a candidate for further chemotherapy or any other intervention as he would not be able to tolerate. He had an intrahepatic pump in place as per patients family. Patient was put on antibiotics for sepsis; also on BiPAP for respiratory failure. Patient was put on bicarb drip for BELIA. The patient was followed by Palliative Care; given morphine for pain. DNR/DNI signed on 18. The patient  at 5:16 AM on 18.

## 2018-12-23 LAB
CULTURE RESULTS: SIGNIFICANT CHANGE UP
CULTURE RESULTS: SIGNIFICANT CHANGE UP
SPECIMEN SOURCE: SIGNIFICANT CHANGE UP
SPECIMEN SOURCE: SIGNIFICANT CHANGE UP

## 2018-12-24 LAB
CULTURE RESULTS: SIGNIFICANT CHANGE UP
SPECIMEN SOURCE: SIGNIFICANT CHANGE UP

## 2018-12-26 DIAGNOSIS — E87.5 HYPERKALEMIA: ICD-10-CM

## 2018-12-26 DIAGNOSIS — C78.7 SECONDARY MALIGNANT NEOPLASM OF LIVER AND INTRAHEPATIC BILE DUCT: ICD-10-CM

## 2018-12-26 DIAGNOSIS — R65.21 SEVERE SEPSIS WITH SEPTIC SHOCK: ICD-10-CM

## 2018-12-26 DIAGNOSIS — C18.9 MALIGNANT NEOPLASM OF COLON, UNSPECIFIED: ICD-10-CM

## 2018-12-26 DIAGNOSIS — K76.7 HEPATORENAL SYNDROME: ICD-10-CM

## 2018-12-26 DIAGNOSIS — J96.01 ACUTE RESPIRATORY FAILURE WITH HYPOXIA: ICD-10-CM

## 2018-12-26 DIAGNOSIS — Z66 DO NOT RESUSCITATE: ICD-10-CM

## 2018-12-26 DIAGNOSIS — R06.02 SHORTNESS OF BREATH: ICD-10-CM

## 2018-12-26 DIAGNOSIS — D63.8 ANEMIA IN OTHER CHRONIC DISEASES CLASSIFIED ELSEWHERE: ICD-10-CM

## 2018-12-26 DIAGNOSIS — A41.9 SEPSIS, UNSPECIFIED ORGANISM: ICD-10-CM

## 2018-12-26 DIAGNOSIS — C78.00 SECONDARY MALIGNANT NEOPLASM OF UNSPECIFIED LUNG: ICD-10-CM

## 2018-12-26 DIAGNOSIS — N48.89 OTHER SPECIFIED DISORDERS OF PENIS: ICD-10-CM

## 2018-12-26 DIAGNOSIS — J15.6 PNEUMONIA DUE TO OTHER GRAM-NEGATIVE BACTERIA: ICD-10-CM

## 2018-12-26 DIAGNOSIS — N17.9 ACUTE KIDNEY FAILURE, UNSPECIFIED: ICD-10-CM

## 2018-12-26 DIAGNOSIS — Z51.5 ENCOUNTER FOR PALLIATIVE CARE: ICD-10-CM

## 2019-04-22 NOTE — PROGRESS NOTE ADULT - PROVIDER SPECIALTY LIST ADULT
.DATE:  4/22/2019   TIME OF RECEIPT FROM LAB:  4339  LAB TEST:  Lactic  LAB VALUE:  2.8  RESULTS GIVEN WITH READ-BACK TO (PROVIDER):  Dr. Smyth  TIME LAB VALUE REPORTED TO PROVIDER:   7446       Critical Care
